# Patient Record
Sex: MALE | Race: OTHER | NOT HISPANIC OR LATINO | URBAN - METROPOLITAN AREA
[De-identification: names, ages, dates, MRNs, and addresses within clinical notes are randomized per-mention and may not be internally consistent; named-entity substitution may affect disease eponyms.]

---

## 2021-07-30 ENCOUNTER — EMERGENCY (EMERGENCY)
Facility: HOSPITAL | Age: 66
LOS: 1 days | Discharge: HOME | End: 2021-07-30
Attending: EMERGENCY MEDICINE | Admitting: EMERGENCY MEDICINE
Payer: COMMERCIAL

## 2021-07-30 VITALS
HEIGHT: 69 IN | DIASTOLIC BLOOD PRESSURE: 84 MMHG | RESPIRATION RATE: 16 BRPM | TEMPERATURE: 98 F | HEART RATE: 98 BPM | WEIGHT: 175.05 LBS | OXYGEN SATURATION: 97 % | SYSTOLIC BLOOD PRESSURE: 142 MMHG

## 2021-07-30 VITALS
HEART RATE: 81 BPM | DIASTOLIC BLOOD PRESSURE: 80 MMHG | SYSTOLIC BLOOD PRESSURE: 119 MMHG | OXYGEN SATURATION: 98 % | RESPIRATION RATE: 15 BRPM | TEMPERATURE: 98 F

## 2021-07-30 LAB
APPEARANCE UR: CLEAR — SIGNIFICANT CHANGE UP
BACTERIA # UR AUTO: ABNORMAL
BILIRUB UR-MCNC: NEGATIVE — SIGNIFICANT CHANGE UP
COLOR SPEC: YELLOW — SIGNIFICANT CHANGE UP
DIFF PNL FLD: ABNORMAL
EPI CELLS # UR: SIGNIFICANT CHANGE UP
GLUCOSE UR QL: 1000 MG/DL
KETONES UR-MCNC: NEGATIVE — SIGNIFICANT CHANGE UP
LEUKOCYTE ESTERASE UR-ACNC: ABNORMAL
NITRITE UR-MCNC: NEGATIVE — SIGNIFICANT CHANGE UP
PH UR: 7 — SIGNIFICANT CHANGE UP (ref 5–8)
PROT UR-MCNC: 15 MG/DL
RBC CASTS # UR COMP ASSIST: ABNORMAL /HPF (ref 0–4)
SP GR SPEC: 1.01 — SIGNIFICANT CHANGE UP (ref 1.01–1.02)
UROBILINOGEN FLD QL: NEGATIVE MG/DL — SIGNIFICANT CHANGE UP
WBC UR QL: SIGNIFICANT CHANGE UP

## 2021-07-30 PROCEDURE — 87086 URINE CULTURE/COLONY COUNT: CPT

## 2021-07-30 PROCEDURE — 51702 INSERT TEMP BLADDER CATH: CPT

## 2021-07-30 PROCEDURE — 99283 EMERGENCY DEPT VISIT LOW MDM: CPT | Mod: 25

## 2021-07-30 PROCEDURE — 81001 URINALYSIS AUTO W/SCOPE: CPT

## 2021-07-30 PROCEDURE — 99284 EMERGENCY DEPT VISIT MOD MDM: CPT

## 2021-07-30 RX ORDER — AZTREONAM 2 G
1 VIAL (EA) INJECTION
Qty: 14 | Refills: 0
Start: 2021-07-30 | End: 2021-08-05

## 2021-07-30 NOTE — ED ADULT NURSE NOTE - OBJECTIVE STATEMENT
pt c/o lower suprapubic abd pain, urinary retention x 4 days and difficulty urinating. pt has hx of BPH in past with new dx of MS. ( currently being worked up for). pt denies any other symptoms. pt denies cp, sob, back pain, numbness, weakness, fevers, chills, body aches, cough, or other symptoms. states he feels like he has to pee but cant. bladder scanned performed on arrival 600cc. mcrae placed per SHARAD Tuttle.

## 2021-07-30 NOTE — ED PROVIDER NOTE - CARE PROVIDERS DIRECT ADDRESSES
[email or web address]

## 2021-07-30 NOTE — ED PROVIDER NOTE - PATIENT PORTAL LINK FT
You can access the FollowMyHealth Patient Portal offered by [facility] by registering at the following website: [email or web address]. By joining [employer]’s FollowMyHealth portal, you will also be able to view your health information using other applications (apps) compatible with our system.

## 2021-07-30 NOTE — ED PROVIDER NOTE - PROGRESS NOTE DETAILS
Gregorio Hicks: 66 y/o M with hx of BPH c/o inability to urinate x7 days, only can pass tiny amounts of urine at a time. denies fever, dysuria, hematuria, abd pain or other sx. Has no urologist. takes Flomax prescribed by PCP in [place]. PE: vital signs stable, afebrile, no distress, abd soft non-tender, moderate suprapubic fullness, no CVA tenderness, extremities no edema, Impression: urinary retention. Plan: Coker catheter, UA, C&S and d/c with leg bag and urology F/U.

## 2021-07-30 NOTE — ED PROVIDER NOTE - NSFOLLOWUPINSTRUCTIONS_ED_ALL_ED_FT
Follow up with urology  drink plenty of water  return to er for any worsening symptoms    Urinary Retention in Men    WHAT YOU NEED TO KNOW:    Urinary retention is a condition that develops when your bladder does not empty completely when you urinate.     Male Reproductive System         DISCHARGE INSTRUCTIONS:    Medicines:   •Medicines can help decrease the size of your prostate, fight infection, and help you urinate more easily.      •Take your medicine as directed. Contact your healthcare provider if you think your medicine is not helping or if you have side effects. Tell him or her if you are allergic to any medicine. Keep a list of the medicines, vitamins, and herbs you take. Include the amounts, and when and why you take them. Bring the list or the pill bottles to follow-up visits. Carry your medicine list with you in case of an emergency.      Coker catheter care: You may need a Coker catheter for up to 2 weeks at home. Healthcare providers will give you a smaller leg bag to collect urine. Keep the bag below your waist. This will prevent urine from flowing back into your bladder and causing an infection or other problems. Also, keep the tube free of kinks so the urine will drain properly. Do not pull on the catheter. This can cause pain and bleeding, and may cause the catheter to come out. Ask your healthcare provider or urologist for more information on Coker catheter care.    Urinate regularly: When your catheter is removed, do not let your bladder become too full before you urinate. Set regular times each day to urinate. Urinate as soon as you feel the need or at least every 3 hours while you are awake. Do not drink liquids before you go to bed. Urinate right before you go to bed.    Follow up with your healthcare provider or urologist as directed: Write down your questions so you remember to ask them during your visits.     Contact your healthcare provider or urologist if:   •You have a fever.      •You have pain when you urinate.      •You have blood in your urine.      •You have problems with your catheter.      •You have questions or concerns about your condition or care.      Return to the emergency department if:   •You have severe abdominal pain.      •You are breathing faster than usual.      •Your heartbeat is faster than usual.      •Your face, hands, feet, or ankles are swollen.

## 2021-07-30 NOTE — ED PROVIDER NOTE - OBJECTIVE STATEMENT
Pt is a 64 yo mal with pmhx of BPH DM Hyperlipemia MS and TB on medication here for increased urinary frequency urgency hesitancy dysuria and hematuria for a few days and today unable to urinate. Pt denies any flank pain nvd fever chills blood thinners. Pt has never had mcrae and no retention before  no urologist  [place] [facility] [place]

## 2021-07-30 NOTE — ED PROVIDER NOTE - CARE PROVIDER_API CALL
Brine Gilmore)  Urology  [address] [place] [address], [address]  [place] [address]  Phone: [phone]  Fax: [phone]  Follow Up Time:

## 2021-07-30 NOTE — ED PROVIDER NOTE - CPE EDP PSYCH NORM
Pt arrived to ED with cc of continued body aches since July 11th. Pt was seen at clinic last week for same issue, patient was prescribed steroids and pain was improved.   
normal...

## 2021-07-30 NOTE — ED ADULT NURSE REASSESSMENT NOTE - NS ED NURSE REASSESS COMMENT FT1
initial urine output post Coker insertion >700cc Ml urine. Pt states immediate relief of pain / discomfort.

## 2021-07-30 NOTE — ED ADULT NURSE NOTE - PMH
BPH (benign prostatic hyperplasia)    DM (diabetes mellitus)    Hyperlipemia    MS (multiple sclerosis)

## 2021-07-31 LAB
CULTURE RESULTS: NO GROWTH — SIGNIFICANT CHANGE UP
SPECIMEN SOURCE: SIGNIFICANT CHANGE UP

## 2021-08-12 ENCOUNTER — EMERGENCY (EMERGENCY)
Facility: HOSPITAL | Age: 66
LOS: 1 days | Discharge: HOME | End: 2021-08-12
Attending: EMERGENCY MEDICINE | Admitting: EMERGENCY MEDICINE
Payer: COMMERCIAL

## 2021-08-12 VITALS
TEMPERATURE: 98 F | HEIGHT: 69 IN | OXYGEN SATURATION: 98 % | DIASTOLIC BLOOD PRESSURE: 79 MMHG | SYSTOLIC BLOOD PRESSURE: 161 MMHG | RESPIRATION RATE: 22 BRPM | HEART RATE: 98 BPM | WEIGHT: 175.05 LBS

## 2021-08-12 VITALS
OXYGEN SATURATION: 99 % | SYSTOLIC BLOOD PRESSURE: 131 MMHG | TEMPERATURE: 98 F | DIASTOLIC BLOOD PRESSURE: 77 MMHG | HEART RATE: 90 BPM | RESPIRATION RATE: 18 BRPM

## 2021-08-12 LAB
APPEARANCE UR: CLEAR — SIGNIFICANT CHANGE UP
BILIRUB UR-MCNC: NEGATIVE — SIGNIFICANT CHANGE UP
COLOR SPEC: YELLOW — SIGNIFICANT CHANGE UP
DIFF PNL FLD: ABNORMAL
GLUCOSE UR QL: 1000 MG/DL
KETONES UR-MCNC: NEGATIVE — SIGNIFICANT CHANGE UP
LEUKOCYTE ESTERASE UR-ACNC: NEGATIVE — SIGNIFICANT CHANGE UP
NITRITE UR-MCNC: NEGATIVE — SIGNIFICANT CHANGE UP
PH UR: 6.5 — SIGNIFICANT CHANGE UP (ref 5–8)
PROT UR-MCNC: NEGATIVE MG/DL — SIGNIFICANT CHANGE UP
SP GR SPEC: 1 — LOW (ref 1.01–1.02)
UROBILINOGEN FLD QL: NEGATIVE MG/DL — SIGNIFICANT CHANGE UP

## 2021-08-12 PROCEDURE — 87086 URINE CULTURE/COLONY COUNT: CPT

## 2021-08-12 PROCEDURE — 99284 EMERGENCY DEPT VISIT MOD MDM: CPT

## 2021-08-12 PROCEDURE — 87186 SC STD MICRODIL/AGAR DIL: CPT

## 2021-08-12 PROCEDURE — 99283 EMERGENCY DEPT VISIT LOW MDM: CPT | Mod: 25

## 2021-08-12 PROCEDURE — 81001 URINALYSIS AUTO W/SCOPE: CPT

## 2021-08-12 NOTE — ED PROVIDER NOTE - NSTIMEPROVIDERCAREINITIATE_GEN_ER
Patient here for medication refills. Medication Reconciliation: complete.    Mis Mar LPN  12/5/2019 10:57 AM   12-Aug-2021 19:30

## 2021-08-12 NOTE — ED ADULT NURSE NOTE - NSICDXPASTMEDICALHX_GEN_ALL_CORE_FT
PAST MEDICAL HISTORY:  BPH (benign prostatic hyperplasia)     DM (diabetes mellitus)     Hyperlipemia     MS (multiple sclerosis)

## 2021-08-12 NOTE — ED PROVIDER NOTE - OBJECTIVE STATEMENT
66 y/o M presents to ED c/o urinating retention. Pt had Coker removed today and last time he urinated was at 3pm. Denies fever, chills, n/v, dysuria, and hematuria. PCP:  Dr. Samuel

## 2021-08-12 NOTE — ED PROVIDER NOTE - NSFOLLOWUPINSTRUCTIONS_ED_ALL_ED_FT
Urinary Retention in Men    WHAT YOU NEED TO KNOW:    Urinary retention is a condition that develops when your bladder does not empty completely when you urinate.     Male Reproductive System         DISCHARGE INSTRUCTIONS:    Medicines:   •Medicines can help decrease the size of your prostate, fight infection, and help you urinate more easily.      •Take your medicine as directed. Contact your healthcare provider if you think your medicine is not helping or if you have side effects. Tell him or her if you are allergic to any medicine. Keep a list of the medicines, vitamins, and herbs you take. Include the amounts, and when and why you take them. Bring the list or the pill bottles to follow-up visits. Carry your medicine list with you in case of an emergency.      Coker catheter care: You may need a Coker catheter for up to 2 weeks at home. Healthcare providers will give you a smaller leg bag to collect urine. Keep the bag below your waist. This will prevent urine from flowing back into your bladder and causing an infection or other problems. Also, keep the tube free of kinks so the urine will drain properly. Do not pull on the catheter. This can cause pain and bleeding, and may cause the catheter to come out. Ask your healthcare provider or urologist for more information on Coker catheter care.    Urinate regularly: When your catheter is removed, do not let your bladder become too full before you urinate. Set regular times each day to urinate. Urinate as soon as you feel the need or at least every 3 hours while you are awake. Do not drink liquids before you go to bed. Urinate right before you go to bed.    Follow up with your healthcare provider or urologist as directed: Write down your questions so you remember to ask them during your visits.     Contact your healthcare provider or urologist if:   •You have a fever.      •You have pain when you urinate.      •You have blood in your urine.      •You have problems with your catheter.      •You have questions or concerns about your condition or care.      Return to the emergency department if:   •You have severe abdominal pain.      •You are breathing faster than usual.      •Your heartbeat is faster than usual.      •Your face, hands, feet, or ankles are swollen.

## 2021-08-12 NOTE — ED ADULT TRIAGE NOTE - PAIN: PRESENCE, MLM
Patient requesting prescription for name brand synthroid prescription, does not want generic. Please send ASAP, patient almost out of medication.   complains of pain/discomfort

## 2021-08-12 NOTE — ED PROVIDER NOTE - PATIENT PORTAL LINK FT
You can access the FollowMyHealth Patient Portal offered by [facility] by registering at the following website: [email or web address]. By joining [employer]’s FollowMyHealth portal, you will also be able to view your health information using other applications (apps) compatible with our system.

## 2021-08-12 NOTE — ED PROVIDER NOTE - CARE PROVIDER_API CALL
Brien Gilmore)  Urology  [address] [place] [address], [address]  [place] [address]  Phone: [phone]  Fax: [phone]  Follow Up Time: 1-3 Days

## 2021-08-12 NOTE — ED ADULT NURSE NOTE - OBJECTIVE STATEMENT
pt c/o intense lower abd pain, states he cant cas. pt had Mcrae removed today at urologist office at 12pm today last urinated at 3pm, but not a lot. has a lot of pressure, cant sit still. requesting mcrae

## 2021-08-12 NOTE — ED PROVIDER NOTE - PROGRESS NOTE DETAILS
put out over 700mL urine initially, switching to leg bag, was supposed to see  in the morning, pt will call and let them know what happened

## 2021-08-13 PROBLEM — G35 MULTIPLE SCLEROSIS: Chronic | Status: ACTIVE | Noted: 2021-07-30

## 2021-08-13 PROBLEM — N40.0 BENIGN PROSTATIC HYPERPLASIA WITHOUT LOWER URINARY TRACT SYMPTOMS: Chronic | Status: ACTIVE | Noted: 2021-07-30

## 2021-08-13 PROBLEM — E78.5 HYPERLIPIDEMIA, UNSPECIFIED: Chronic | Status: ACTIVE | Noted: 2021-07-30

## 2021-08-13 PROBLEM — E11.9 TYPE 2 DIABETES MELLITUS WITHOUT COMPLICATIONS: Chronic | Status: ACTIVE | Noted: 2021-07-30

## 2021-08-17 NOTE — ED POST DISCHARGE NOTE - RESULT SUMMARY
urine culture + ecoli, resistant to bactrim, call placed to patient but no answer, will try again later

## 2021-08-20 ENCOUNTER — EMERGENCY (EMERGENCY)
Facility: HOSPITAL | Age: 66
LOS: 1 days | Discharge: HOME | End: 2021-08-20
Attending: EMERGENCY MEDICINE | Admitting: INTERNAL MEDICINE
Payer: COMMERCIAL

## 2021-08-20 VITALS
HEART RATE: 100 BPM | WEIGHT: 173.94 LBS | SYSTOLIC BLOOD PRESSURE: 124 MMHG | DIASTOLIC BLOOD PRESSURE: 60 MMHG | OXYGEN SATURATION: 95 % | RESPIRATION RATE: 20 BRPM | HEIGHT: 69 IN | TEMPERATURE: 99 F

## 2021-08-20 VITALS
OXYGEN SATURATION: 100 % | HEART RATE: 98 BPM | DIASTOLIC BLOOD PRESSURE: 77 MMHG | SYSTOLIC BLOOD PRESSURE: 139 MMHG | RESPIRATION RATE: 16 BRPM | TEMPERATURE: 98 F

## 2021-08-20 LAB
ALBUMIN SERPL ELPH-MCNC: 3.3 G/DL — SIGNIFICANT CHANGE UP (ref 3.3–5)
ALP SERPL-CCNC: 81 U/L — SIGNIFICANT CHANGE UP (ref 30–120)
ALT FLD-CCNC: 26 U/L DA — SIGNIFICANT CHANGE UP (ref 10–60)
ANION GAP SERPL CALC-SCNC: 10 MMOL/L — SIGNIFICANT CHANGE UP (ref 5–17)
APPEARANCE UR: ABNORMAL
APTT BLD: 32.4 SEC — SIGNIFICANT CHANGE UP (ref 27.5–35.5)
AST SERPL-CCNC: 25 U/L — SIGNIFICANT CHANGE UP (ref 10–40)
BACTERIA # UR AUTO: ABNORMAL
BASOPHILS # BLD AUTO: 0.02 K/UL — SIGNIFICANT CHANGE UP (ref 0–0.2)
BASOPHILS NFR BLD AUTO: 0.3 % — SIGNIFICANT CHANGE UP (ref 0–2)
BILIRUB SERPL-MCNC: 1.3 MG/DL — HIGH (ref 0.2–1.2)
BILIRUB UR-MCNC: NEGATIVE — SIGNIFICANT CHANGE UP
BUN SERPL-MCNC: 22 MG/DL — SIGNIFICANT CHANGE UP (ref 7–23)
CALCIUM SERPL-MCNC: 8.4 MG/DL — SIGNIFICANT CHANGE UP (ref 8.4–10.5)
CHLORIDE SERPL-SCNC: 95 MMOL/L — LOW (ref 96–108)
CO2 SERPL-SCNC: 27 MMOL/L — SIGNIFICANT CHANGE UP (ref 22–31)
COLOR SPEC: YELLOW — SIGNIFICANT CHANGE UP
CREAT SERPL-MCNC: 1.12 MG/DL — SIGNIFICANT CHANGE UP (ref 0.5–1.3)
DIFF PNL FLD: ABNORMAL
EOSINOPHIL # BLD AUTO: 0.02 K/UL — SIGNIFICANT CHANGE UP (ref 0–0.5)
EOSINOPHIL NFR BLD AUTO: 0.3 % — SIGNIFICANT CHANGE UP (ref 0–6)
EPI CELLS # UR: SIGNIFICANT CHANGE UP
GLUCOSE SERPL-MCNC: 132 MG/DL — HIGH (ref 70–99)
GLUCOSE UR QL: 250 MG/DL
HCT VFR BLD CALC: 39.1 % — SIGNIFICANT CHANGE UP (ref 39–50)
HGB BLD-MCNC: 13.1 G/DL — SIGNIFICANT CHANGE UP (ref 13–17)
IMM GRANULOCYTES NFR BLD AUTO: 0.4 % — SIGNIFICANT CHANGE UP (ref 0–1.5)
INR BLD: 1.39 RATIO — HIGH (ref 0.88–1.16)
KETONES UR-MCNC: ABNORMAL
LACTATE SERPL-SCNC: 0.9 MMOL/L — SIGNIFICANT CHANGE UP (ref 0.7–2)
LEUKOCYTE ESTERASE UR-ACNC: ABNORMAL
LYMPHOCYTES # BLD AUTO: 0.71 K/UL — LOW (ref 1–3.3)
LYMPHOCYTES # BLD AUTO: 8.9 % — LOW (ref 13–44)
MCHC RBC-ENTMCNC: 28.5 PG — SIGNIFICANT CHANGE UP (ref 27–34)
MCHC RBC-ENTMCNC: 33.5 GM/DL — SIGNIFICANT CHANGE UP (ref 32–36)
MCV RBC AUTO: 85.2 FL — SIGNIFICANT CHANGE UP (ref 80–100)
MONOCYTES # BLD AUTO: 0.6 K/UL — SIGNIFICANT CHANGE UP (ref 0–0.9)
MONOCYTES NFR BLD AUTO: 7.5 % — SIGNIFICANT CHANGE UP (ref 2–14)
NEUTROPHILS # BLD AUTO: 6.59 K/UL — SIGNIFICANT CHANGE UP (ref 1.8–7.4)
NEUTROPHILS NFR BLD AUTO: 82.6 % — HIGH (ref 43–77)
NITRITE UR-MCNC: POSITIVE
NRBC # BLD: 0 /100 WBCS — SIGNIFICANT CHANGE UP (ref 0–0)
PH UR: 6 — SIGNIFICANT CHANGE UP (ref 5–8)
PLATELET # BLD AUTO: 153 K/UL — SIGNIFICANT CHANGE UP (ref 150–400)
POTASSIUM SERPL-MCNC: 3.6 MMOL/L — SIGNIFICANT CHANGE UP (ref 3.5–5.3)
POTASSIUM SERPL-SCNC: 3.6 MMOL/L — SIGNIFICANT CHANGE UP (ref 3.5–5.3)
PROT SERPL-MCNC: 8 G/DL — SIGNIFICANT CHANGE UP (ref 6–8.3)
PROT UR-MCNC: 30 MG/DL
PROTHROM AB SERPL-ACNC: 16.6 SEC — HIGH (ref 10.6–13.6)
RBC # BLD: 4.59 M/UL — SIGNIFICANT CHANGE UP (ref 4.2–5.8)
RBC # FLD: 14.4 % — SIGNIFICANT CHANGE UP (ref 10.3–14.5)
RBC CASTS # UR COMP ASSIST: SIGNIFICANT CHANGE UP /HPF (ref 0–4)
SARS-COV-2 RNA SPEC QL NAA+PROBE: SIGNIFICANT CHANGE UP
SODIUM SERPL-SCNC: 132 MMOL/L — LOW (ref 135–145)
SP GR SPEC: 1 — LOW (ref 1.01–1.02)
UROBILINOGEN FLD QL: NEGATIVE MG/DL — SIGNIFICANT CHANGE UP
WBC # BLD: 7.97 K/UL — SIGNIFICANT CHANGE UP (ref 3.8–10.5)
WBC # FLD AUTO: 7.97 K/UL — SIGNIFICANT CHANGE UP (ref 3.8–10.5)
WBC UR QL: ABNORMAL

## 2021-08-20 PROCEDURE — 99285 EMERGENCY DEPT VISIT HI MDM: CPT

## 2021-08-20 PROCEDURE — 71045 X-RAY EXAM CHEST 1 VIEW: CPT | Mod: 26

## 2021-08-20 RX ORDER — INSULIN GLARGINE 100 [IU]/ML
0 INJECTION, SOLUTION SUBCUTANEOUS
Qty: 0 | Refills: 0 | DISCHARGE

## 2021-08-20 RX ORDER — DULAGLUTIDE 4.5 MG/.5ML
0 INJECTION, SOLUTION SUBCUTANEOUS
Qty: 0 | Refills: 0 | DISCHARGE

## 2021-08-20 RX ORDER — ACETAMINOPHEN 500 MG
650 TABLET ORAL ONCE
Refills: 0 | Status: COMPLETED | OUTPATIENT
Start: 2021-08-20 | End: 2021-08-20

## 2021-08-20 RX ORDER — PIPERACILLIN AND TAZOBACTAM 4; .5 G/20ML; G/20ML
3.38 INJECTION, POWDER, LYOPHILIZED, FOR SOLUTION INTRAVENOUS ONCE
Refills: 0 | Status: COMPLETED | OUTPATIENT
Start: 2021-08-20 | End: 2021-08-20

## 2021-08-20 RX ORDER — SERTRALINE 25 MG/1
1 TABLET, FILM COATED ORAL
Qty: 0 | Refills: 0 | DISCHARGE

## 2021-08-20 RX ORDER — CEFUROXIME AXETIL 250 MG
1 TABLET ORAL
Qty: 20 | Refills: 0
Start: 2021-08-20 | End: 2021-08-29

## 2021-08-20 RX ORDER — SODIUM CHLORIDE 9 MG/ML
2400 INJECTION INTRAMUSCULAR; INTRAVENOUS; SUBCUTANEOUS ONCE
Refills: 0 | Status: COMPLETED | OUTPATIENT
Start: 2021-08-20 | End: 2021-08-20

## 2021-08-20 RX ORDER — CYCLOBENZAPRINE HYDROCHLORIDE 10 MG/1
0 TABLET, FILM COATED ORAL
Qty: 0 | Refills: 0 | DISCHARGE

## 2021-08-20 RX ORDER — MIRTAZAPINE 45 MG/1
0 TABLET, ORALLY DISINTEGRATING ORAL
Qty: 0 | Refills: 0 | DISCHARGE

## 2021-08-20 RX ORDER — EMPAGLIFLOZIN, METFORMIN HYDROCHLORIDE 10; 1000 MG/1; MG/1
2 TABLET, EXTENDED RELEASE ORAL
Qty: 0 | Refills: 0 | DISCHARGE

## 2021-08-20 RX ORDER — SIMVASTATIN 20 MG/1
1 TABLET, FILM COATED ORAL
Qty: 0 | Refills: 0 | DISCHARGE

## 2021-08-20 RX ADMIN — Medication 650 MILLIGRAM(S): at 19:15

## 2021-08-20 RX ADMIN — PIPERACILLIN AND TAZOBACTAM 200 GRAM(S): 4; .5 INJECTION, POWDER, LYOPHILIZED, FOR SOLUTION INTRAVENOUS at 19:17

## 2021-08-20 RX ADMIN — SODIUM CHLORIDE 2400 MILLILITER(S): 9 INJECTION INTRAMUSCULAR; INTRAVENOUS; SUBCUTANEOUS at 19:17

## 2021-08-20 NOTE — ED PROVIDER NOTE - TEMPLATE
Symptoms High Dose Vitamin A Counseling: Side effects reviewed, pt to contact office should one occur.

## 2021-08-20 NOTE — ED PROVIDER NOTE - NSFOLLOWUPINSTRUCTIONS_ED_ALL_ED_FT
Take Ceftin 500mg twice daily for Urinary Tract Infection  If you have fevers or chills than return to the emergency room immediately   F/U with Dr Gilmore on Monday     A urinary tract infection (UTI) is an infection of any part of the urinary tract, which includes the kidneys, ureters, bladder, and urethra. Risk factors include ignoring your need to urinate, wiping back to front if female, being an uncircumcised male, and having diabetes or a weak immune system. Symptoms include frequent urination, pain or burning with urination, foul smelling urine, cloudy urine, pain in the lower abdomen, blood in the urine, and fever. If you were prescribed an antibiotic medicine, take it as told by your health care provider. Do not stop taking the antibiotic even if you start to feel better.    SEEK IMMEDIATE MEDICAL CARE IF YOU HAVE ANY OF THE FOLLOWING SYMPTOMS: severe back or abdominal pain, fever, inability to keep fluids or medicine down, dizziness/lightheadedness, or a change in mental status.

## 2021-08-20 NOTE — ED PROVIDER NOTE - PATIENT PORTAL LINK FT
You can access the FollowMyHealth Patient Portal offered by [facility] by registering at the following website: [email or web address]. By joining [employer]’s FollowMyHealth portal, you will also be able to view your health information using other applications (apps) compatible with our system.

## 2021-08-20 NOTE — ED PROVIDER NOTE - PROGRESS NOTE DETAILS
the patient wants to go home, he is afebrile, no chills, no abdominal pain, no N/V, no back pain, he is feeling improved. Labs reporting normal WBC and normal lactate. He has the Coker in for 15 days, awaiting definitive procedure from Dr Gilmore, Bactrim was given empirically the first five days. Hasn't been on AB for the past 10 days. If he develops fever, chills, no any changes he will return to the ED immediately

## 2021-08-20 NOTE — ED ADULT NURSE NOTE - NSICDXPASTMEDICALHX_GEN_ALL_CORE_FT
PAST MEDICAL HISTORY:  Anxiety     BPH (benign prostatic hyperplasia)     DM (diabetes mellitus)     Hyperlipemia     MS (multiple sclerosis)     Tuberculosis

## 2021-08-20 NOTE — ED ADULT NURSE NOTE - NSIMPLEMENTINTERV_GEN_ALL_ED
Implemented All Fall Risk Interventions:  [place] to call system. Call bell, personal items and telephone within reach. Instruct patient to call for assistance. Room bathroom lighting operational. Non-slip footwear when patient is off stretcher. Physically safe environment: no spills, clutter or unnecessary equipment. Stretcher in lowest position, wheels locked, appropriate side rails in place. Provide visual cue, wrist band, yellow gown, etc. Monitor gait and stability. Monitor for mental status changes and reorient to person, place, and time. Review medications for side effects contributing to fall risk. Reinforce activity limits and safety measures with patient and family.

## 2021-08-20 NOTE — ED PROVIDER NOTE - CARE PROVIDER_API CALL
Brien Gilmore)  Urology  [address] [place] [address], [address]  [place] [address]  Phone: [phone]  Fax: [phone]  Follow Up Time: Urgent

## 2021-08-20 NOTE — ED PROVIDER NOTE - OBJECTIVE STATEMENT
64 y/o M with a PMHx of MS, BPH, HLD, DM presents to ED c/o HA, fever, chills, lower abd pain, and nausea today. Pt was recently treated for UTI, has been seeing urology for prostate enlargement and is planning prostate surgery in the near future. States he had a temp of 101 today. Recently completed course of bactrim. Denies cough, SOB, or other sx. Pt is fully vaccinated for COVID. PCP: Dr. Samuel, Urology: Dr. Gilmore

## 2021-08-20 NOTE — ED ADULT NURSE REASSESSMENT NOTE - NS ED NURSE REASSESS COMMENT FT1
MD aware of all results. pt informed of same. d/c to self. urinary catheter to SBG converted to leg bag. pt left unit in NAD. ambulated unassisted with cane and per his usual state of health urinary catheter present on arrival to ED changed for hospital policy. MD aware of all results. pt informed of same. d/c to self. urinary catheter to SBG converted to leg bag. pt left unit in NAD. ambulated unassisted with cane and per his usual state of health urinary catheter present on arrival to ED changed as per hospital policy. MD aware of all results. pt informed of same. d/c to self. urinary catheter to SBG converted to leg bag. pt left unit in NAD. ambulated unassisted with cane and per his usual state of health

## 2021-08-20 NOTE — ED ADULT NURSE NOTE - CHPI ED NUR SYMPTOMS NEG
no back pain/no decreased eating/drinking/no dizziness/no loss of consciousness/no nausea/no pain/no vomiting

## 2021-08-21 LAB
E COLI DNA BLD POS QL NAA+NON-PROBE: SIGNIFICANT CHANGE UP
GRAM STN FLD: SIGNIFICANT CHANGE UP
GRAM STN FLD: SIGNIFICANT CHANGE UP
METHOD TYPE: SIGNIFICANT CHANGE UP
SPECIMEN SOURCE: SIGNIFICANT CHANGE UP

## 2021-08-21 NOTE — PROVIDER CONTACT NOTE (CRITICAL VALUE NOTIFICATION) - ACTION/TREATMENT ORDERED:
md called pt this morning and pt feeling better and started on a new antibiotic yesterday will await second culture

## 2021-08-22 ENCOUNTER — INPATIENT (INPATIENT)
Facility: HOSPITAL | Age: 66
LOS: 2 days | Discharge: HOME | DRG: 699 | End: 2021-08-25
Attending: INTERNAL MEDICINE | Admitting: INTERNAL MEDICINE
Payer: COMMERCIAL

## 2021-08-22 VITALS
OXYGEN SATURATION: 96 % | RESPIRATION RATE: 18 BRPM | TEMPERATURE: 98 F | WEIGHT: 175.05 LBS | HEART RATE: 92 BPM | SYSTOLIC BLOOD PRESSURE: 124 MMHG | DIASTOLIC BLOOD PRESSURE: 74 MMHG | HEIGHT: 69 IN

## 2021-08-22 DIAGNOSIS — R78.81 BACTEREMIA: ICD-10-CM

## 2021-08-22 LAB
ALBUMIN SERPL ELPH-MCNC: 2.9 G/DL — LOW (ref 3.3–5)
ALP SERPL-CCNC: 163 U/L — HIGH (ref 30–120)
ALT FLD-CCNC: 77 U/L DA — HIGH (ref 10–60)
ANION GAP SERPL CALC-SCNC: 8 MMOL/L — SIGNIFICANT CHANGE UP (ref 5–17)
APPEARANCE UR: CLEAR — SIGNIFICANT CHANGE UP
APTT BLD: 31.6 SEC — SIGNIFICANT CHANGE UP (ref 27.5–35.5)
AST SERPL-CCNC: 87 U/L — HIGH (ref 10–40)
BACTERIA # UR AUTO: ABNORMAL
BASOPHILS # BLD AUTO: 0.02 K/UL — SIGNIFICANT CHANGE UP (ref 0–0.2)
BASOPHILS NFR BLD AUTO: 0.4 % — SIGNIFICANT CHANGE UP (ref 0–2)
BILIRUB SERPL-MCNC: 2.4 MG/DL — HIGH (ref 0.2–1.2)
BILIRUB UR-MCNC: ABNORMAL
BUN SERPL-MCNC: 14 MG/DL — SIGNIFICANT CHANGE UP (ref 7–23)
CALCIUM SERPL-MCNC: 8.5 MG/DL — SIGNIFICANT CHANGE UP (ref 8.4–10.5)
CHLORIDE SERPL-SCNC: 98 MMOL/L — SIGNIFICANT CHANGE UP (ref 96–108)
CO2 SERPL-SCNC: 28 MMOL/L — SIGNIFICANT CHANGE UP (ref 22–31)
COLOR SPEC: YELLOW — SIGNIFICANT CHANGE UP
CREAT SERPL-MCNC: 1.03 MG/DL — SIGNIFICANT CHANGE UP (ref 0.5–1.3)
DIFF PNL FLD: ABNORMAL
EOSINOPHIL # BLD AUTO: 0.07 K/UL — SIGNIFICANT CHANGE UP (ref 0–0.5)
EOSINOPHIL NFR BLD AUTO: 1.3 % — SIGNIFICANT CHANGE UP (ref 0–6)
EPI CELLS # UR: SIGNIFICANT CHANGE UP
GLUCOSE SERPL-MCNC: 210 MG/DL — HIGH (ref 70–99)
GLUCOSE UR QL: 1000 MG/DL
HCT VFR BLD CALC: 37.4 % — LOW (ref 39–50)
HGB BLD-MCNC: 12.7 G/DL — LOW (ref 13–17)
IMM GRANULOCYTES NFR BLD AUTO: 0.2 % — SIGNIFICANT CHANGE UP (ref 0–1.5)
INR BLD: 1.38 RATIO — HIGH (ref 0.88–1.16)
KETONES UR-MCNC: NEGATIVE — SIGNIFICANT CHANGE UP
LACTATE SERPL-SCNC: 0.9 MMOL/L — SIGNIFICANT CHANGE UP (ref 0.7–2)
LEUKOCYTE ESTERASE UR-ACNC: ABNORMAL
LYMPHOCYTES # BLD AUTO: 0.58 K/UL — LOW (ref 1–3.3)
LYMPHOCYTES # BLD AUTO: 10.6 % — LOW (ref 13–44)
MCHC RBC-ENTMCNC: 28.7 PG — SIGNIFICANT CHANGE UP (ref 27–34)
MCHC RBC-ENTMCNC: 34 GM/DL — SIGNIFICANT CHANGE UP (ref 32–36)
MCV RBC AUTO: 84.6 FL — SIGNIFICANT CHANGE UP (ref 80–100)
MONOCYTES # BLD AUTO: 0.67 K/UL — SIGNIFICANT CHANGE UP (ref 0–0.9)
MONOCYTES NFR BLD AUTO: 12.2 % — SIGNIFICANT CHANGE UP (ref 2–14)
NEUTROPHILS # BLD AUTO: 4.13 K/UL — SIGNIFICANT CHANGE UP (ref 1.8–7.4)
NEUTROPHILS NFR BLD AUTO: 75.3 % — SIGNIFICANT CHANGE UP (ref 43–77)
NITRITE UR-MCNC: NEGATIVE — SIGNIFICANT CHANGE UP
NRBC # BLD: 0 /100 WBCS — SIGNIFICANT CHANGE UP (ref 0–0)
PH UR: 6.5 — SIGNIFICANT CHANGE UP (ref 5–8)
PLATELET # BLD AUTO: 143 K/UL — LOW (ref 150–400)
POTASSIUM SERPL-MCNC: 3.5 MMOL/L — SIGNIFICANT CHANGE UP (ref 3.5–5.3)
POTASSIUM SERPL-SCNC: 3.5 MMOL/L — SIGNIFICANT CHANGE UP (ref 3.5–5.3)
PROT SERPL-MCNC: 7.6 G/DL — SIGNIFICANT CHANGE UP (ref 6–8.3)
PROT UR-MCNC: 30 MG/DL
PROTHROM AB SERPL-ACNC: 16.5 SEC — HIGH (ref 10.6–13.6)
RBC # BLD: 4.42 M/UL — SIGNIFICANT CHANGE UP (ref 4.2–5.8)
RBC # FLD: 14.7 % — HIGH (ref 10.3–14.5)
RBC CASTS # UR COMP ASSIST: ABNORMAL /HPF (ref 0–4)
SARS-COV-2 RNA SPEC QL NAA+PROBE: SIGNIFICANT CHANGE UP
SODIUM SERPL-SCNC: 134 MMOL/L — LOW (ref 135–145)
SP GR SPEC: 1 — LOW (ref 1.01–1.02)
UROBILINOGEN FLD QL: 4 MG/DL
WBC # BLD: 5.48 K/UL — SIGNIFICANT CHANGE UP (ref 3.8–10.5)
WBC # FLD AUTO: 5.48 K/UL — SIGNIFICANT CHANGE UP (ref 3.8–10.5)
WBC UR QL: SIGNIFICANT CHANGE UP

## 2021-08-22 PROCEDURE — 74177 CT ABD & PELVIS W/CONTRAST: CPT | Mod: 26,MG

## 2021-08-22 PROCEDURE — 99223 1ST HOSP IP/OBS HIGH 75: CPT

## 2021-08-22 PROCEDURE — G1004: CPT

## 2021-08-22 PROCEDURE — 71260 CT THORAX DX C+: CPT | Mod: 26,MG

## 2021-08-22 PROCEDURE — 99285 EMERGENCY DEPT VISIT HI MDM: CPT

## 2021-08-22 PROCEDURE — 71046 X-RAY EXAM CHEST 2 VIEWS: CPT | Mod: 26

## 2021-08-22 PROCEDURE — 93010 ELECTROCARDIOGRAM REPORT: CPT

## 2021-08-22 RX ORDER — ZOLPIDEM TARTRATE 10 MG/1
5 TABLET ORAL AT BEDTIME
Refills: 0 | Status: DISCONTINUED | OUTPATIENT
Start: 2021-08-22 | End: 2021-08-25

## 2021-08-22 RX ORDER — CYCLOBENZAPRINE HYDROCHLORIDE 10 MG/1
1 TABLET, FILM COATED ORAL
Qty: 0 | Refills: 0 | DISCHARGE

## 2021-08-22 RX ORDER — SODIUM CHLORIDE 9 MG/ML
1000 INJECTION, SOLUTION INTRAVENOUS
Refills: 0 | Status: DISCONTINUED | OUTPATIENT
Start: 2021-08-22 | End: 2021-08-23

## 2021-08-22 RX ORDER — DULAGLUTIDE 4.5 MG/.5ML
0 INJECTION, SOLUTION SUBCUTANEOUS
Qty: 0 | Refills: 0 | DISCHARGE

## 2021-08-22 RX ORDER — TAMSULOSIN HYDROCHLORIDE 0.4 MG/1
0.8 CAPSULE ORAL AT BEDTIME
Refills: 0 | Status: DISCONTINUED | OUTPATIENT
Start: 2021-08-22 | End: 2021-08-25

## 2021-08-22 RX ORDER — MIRTAZAPINE 45 MG/1
0 TABLET, ORALLY DISINTEGRATING ORAL
Qty: 0 | Refills: 0 | DISCHARGE

## 2021-08-22 RX ORDER — SIMVASTATIN 20 MG/1
1 TABLET, FILM COATED ORAL
Qty: 0 | Refills: 0 | DISCHARGE

## 2021-08-22 RX ORDER — MIRTAZAPINE 45 MG/1
15 TABLET, ORALLY DISINTEGRATING ORAL
Qty: 0 | Refills: 0 | DISCHARGE

## 2021-08-22 RX ORDER — INSULIN GLARGINE 100 [IU]/ML
64 INJECTION, SOLUTION SUBCUTANEOUS
Qty: 0 | Refills: 0 | DISCHARGE

## 2021-08-22 RX ORDER — TAMSULOSIN HYDROCHLORIDE 0.4 MG/1
2 CAPSULE ORAL
Qty: 0 | Refills: 0 | DISCHARGE

## 2021-08-22 RX ORDER — DEXTROSE 50 % IN WATER 50 %
25 SYRINGE (ML) INTRAVENOUS ONCE
Refills: 0 | Status: DISCONTINUED | OUTPATIENT
Start: 2021-08-22 | End: 2021-08-25

## 2021-08-22 RX ORDER — MIRTAZAPINE 45 MG/1
7.5 TABLET, ORALLY DISINTEGRATING ORAL AT BEDTIME
Refills: 0 | Status: DISCONTINUED | OUTPATIENT
Start: 2021-08-22 | End: 2021-08-24

## 2021-08-22 RX ORDER — INSULIN GLARGINE 100 [IU]/ML
64 INJECTION, SOLUTION SUBCUTANEOUS AT BEDTIME
Refills: 0 | Status: DISCONTINUED | OUTPATIENT
Start: 2021-08-22 | End: 2021-08-23

## 2021-08-22 RX ORDER — CLONAZEPAM 1 MG
1 TABLET ORAL ONCE
Refills: 0 | Status: DISCONTINUED | OUTPATIENT
Start: 2021-08-22 | End: 2021-08-22

## 2021-08-22 RX ORDER — CLONAZEPAM 1 MG
1 TABLET ORAL
Qty: 0 | Refills: 0 | DISCHARGE

## 2021-08-22 RX ORDER — CYCLOBENZAPRINE HYDROCHLORIDE 10 MG/1
5 TABLET, FILM COATED ORAL AT BEDTIME
Refills: 0 | Status: DISCONTINUED | OUTPATIENT
Start: 2021-08-22 | End: 2021-08-25

## 2021-08-22 RX ORDER — INSULIN LISPRO 100/ML
VIAL (ML) SUBCUTANEOUS
Refills: 0 | Status: DISCONTINUED | OUTPATIENT
Start: 2021-08-22 | End: 2021-08-25

## 2021-08-22 RX ORDER — SERTRALINE 25 MG/1
100 TABLET, FILM COATED ORAL DAILY
Refills: 0 | Status: DISCONTINUED | OUTPATIENT
Start: 2021-08-22 | End: 2021-08-25

## 2021-08-22 RX ORDER — CLONAZEPAM 1 MG
1 TABLET ORAL AT BEDTIME
Refills: 0 | Status: DISCONTINUED | OUTPATIENT
Start: 2021-08-22 | End: 2021-08-25

## 2021-08-22 RX ORDER — CEFTRIAXONE 500 MG/1
1000 INJECTION, POWDER, FOR SOLUTION INTRAMUSCULAR; INTRAVENOUS ONCE
Refills: 0 | Status: COMPLETED | OUTPATIENT
Start: 2021-08-22 | End: 2021-08-22

## 2021-08-22 RX ORDER — DEXTROSE 50 % IN WATER 50 %
15 SYRINGE (ML) INTRAVENOUS ONCE
Refills: 0 | Status: DISCONTINUED | OUTPATIENT
Start: 2021-08-22 | End: 2021-08-25

## 2021-08-22 RX ORDER — GLUCAGON INJECTION, SOLUTION 0.5 MG/.1ML
1 INJECTION, SOLUTION SUBCUTANEOUS ONCE
Refills: 0 | Status: DISCONTINUED | OUTPATIENT
Start: 2021-08-22 | End: 2021-08-25

## 2021-08-22 RX ORDER — ASPIRIN/CALCIUM CARB/MAGNESIUM 324 MG
1 TABLET ORAL
Qty: 0 | Refills: 0 | DISCHARGE

## 2021-08-22 RX ORDER — ZOLPIDEM TARTRATE 10 MG/1
1 TABLET ORAL
Qty: 0 | Refills: 0 | DISCHARGE

## 2021-08-22 RX ORDER — INSULIN LISPRO 100/ML
VIAL (ML) SUBCUTANEOUS AT BEDTIME
Refills: 0 | Status: DISCONTINUED | OUTPATIENT
Start: 2021-08-22 | End: 2021-08-25

## 2021-08-22 RX ORDER — MIRTAZAPINE 45 MG/1
1 TABLET, ORALLY DISINTEGRATING ORAL
Qty: 0 | Refills: 0 | DISCHARGE

## 2021-08-22 RX ORDER — DEXTROSE 50 % IN WATER 50 %
12.5 SYRINGE (ML) INTRAVENOUS ONCE
Refills: 0 | Status: DISCONTINUED | OUTPATIENT
Start: 2021-08-22 | End: 2021-08-25

## 2021-08-22 RX ORDER — TERAZOSIN HYDROCHLORIDE 10 MG/1
1 CAPSULE ORAL
Qty: 0 | Refills: 0 | DISCHARGE

## 2021-08-22 RX ORDER — SODIUM CHLORIDE 9 MG/ML
1000 INJECTION, SOLUTION INTRAVENOUS
Refills: 0 | Status: DISCONTINUED | OUTPATIENT
Start: 2021-08-22 | End: 2021-08-25

## 2021-08-22 RX ORDER — ASPIRIN/CALCIUM CARB/MAGNESIUM 324 MG
81 TABLET ORAL DAILY
Refills: 0 | Status: DISCONTINUED | OUTPATIENT
Start: 2021-08-22 | End: 2021-08-25

## 2021-08-22 RX ORDER — SODIUM CHLORIDE 9 MG/ML
1000 INJECTION INTRAMUSCULAR; INTRAVENOUS; SUBCUTANEOUS ONCE
Refills: 0 | Status: COMPLETED | OUTPATIENT
Start: 2021-08-22 | End: 2021-08-22

## 2021-08-22 RX ORDER — TAMSULOSIN HYDROCHLORIDE 0.4 MG/1
1 CAPSULE ORAL
Qty: 0 | Refills: 0 | DISCHARGE

## 2021-08-22 RX ORDER — SERTRALINE 25 MG/1
1 TABLET, FILM COATED ORAL
Qty: 0 | Refills: 0 | DISCHARGE

## 2021-08-22 RX ORDER — EMPAGLIFLOZIN, METFORMIN HYDROCHLORIDE 10; 1000 MG/1; MG/1
2 TABLET, EXTENDED RELEASE ORAL
Qty: 0 | Refills: 0 | DISCHARGE

## 2021-08-22 RX ORDER — ENOXAPARIN SODIUM 100 MG/ML
40 INJECTION SUBCUTANEOUS DAILY
Refills: 0 | Status: DISCONTINUED | OUTPATIENT
Start: 2021-08-22 | End: 2021-08-25

## 2021-08-22 RX ADMIN — CEFTRIAXONE 1000 MILLIGRAM(S): 500 INJECTION, POWDER, FOR SOLUTION INTRAMUSCULAR; INTRAVENOUS at 18:04

## 2021-08-22 RX ADMIN — CEFTRIAXONE 100 MILLIGRAM(S): 500 INJECTION, POWDER, FOR SOLUTION INTRAMUSCULAR; INTRAVENOUS at 17:07

## 2021-08-22 RX ADMIN — SERTRALINE 100 MILLIGRAM(S): 25 TABLET, FILM COATED ORAL at 23:59

## 2021-08-22 RX ADMIN — SODIUM CHLORIDE 1000 MILLILITER(S): 9 INJECTION INTRAMUSCULAR; INTRAVENOUS; SUBCUTANEOUS at 18:05

## 2021-08-22 RX ADMIN — Medication 1 TABLET(S): at 23:59

## 2021-08-22 RX ADMIN — Medication 1 MILLIGRAM(S): at 23:50

## 2021-08-22 RX ADMIN — MIRTAZAPINE 7.5 MILLIGRAM(S): 45 TABLET, ORALLY DISINTEGRATING ORAL at 23:59

## 2021-08-22 RX ADMIN — Medication 1 MILLIGRAM(S): at 18:08

## 2021-08-22 RX ADMIN — SODIUM CHLORIDE 1000 MILLILITER(S): 9 INJECTION INTRAMUSCULAR; INTRAVENOUS; SUBCUTANEOUS at 19:03

## 2021-08-22 RX ADMIN — ENOXAPARIN SODIUM 40 MILLIGRAM(S): 100 INJECTION SUBCUTANEOUS at 23:59

## 2021-08-22 RX ADMIN — CYCLOBENZAPRINE HYDROCHLORIDE 5 MILLIGRAM(S): 10 TABLET, FILM COATED ORAL at 23:59

## 2021-08-22 NOTE — ED PROVIDER NOTE - CLINICAL SUMMARY MEDICAL DECISION MAKING FREE TEXT BOX
64 y/o M called back in to ED for E coli bacteremia. Plan: labs, cultures, IV antibiotics, and admission.

## 2021-08-22 NOTE — H&P ADULT - PROBLEM SELECTOR PLAN 1
no hydronephrosis or hydroureter, was started on Bactrim DS over the past 2 day, cultures showed E-coli in urine & blood, his Coker was changed 2 days ago, admitted to medicine, was started on Ceftriaxone 1 gm IVPB q 24h, trend temp, monitor TLC, and f/u sensitivity results, ID consult with Dr. Fox was called.

## 2021-08-22 NOTE — ED ADULT NURSE NOTE - OBJECTIVE STATEMENT
fever/chills 2 days ago, mcrae cath on and off for over a month for urinary retention, last reinsertion 2 days ago

## 2021-08-22 NOTE — H&P ADULT - HISTORY OF PRESENT ILLNESS
This is a 66 y/o M with PMH of HTN, DM type 2, Dyslipidemia, MS with right sided lower extremity weakness, Latent TB on therapy, recently diagnosed BPH with urinary retention with indwelling urinary catheter, Insomnia, and Anxiety who was called to the ED for a positive blood culture. Patient was seen at [facility] ED the day before yesterday for fever & chills, his Coker's cath was changed and he was sent home on bactrim for UTI after urine culture & blood culture X2 were obtained by ED team. Today his preliminary culture result was positive for E-Coli so he was called to come back to the ED. Patient reports intermittent fever & chills with nausea, last temp was 100.0 this am, had a single vomiting episode 2 days ago after he left the ED, unremarkable vomitus, no vomiting since then. Patient was diagnosed with MS in February, and was found to have latent TB on routine w/u prior to starting his MS treatment, and was started on TB meds.

## 2021-08-22 NOTE — ED PROVIDER NOTE - SKIN, MLM
Skin normal color for race, warm, dry. No evidence of rash. Superficial abrasions to R shin and R knee.

## 2021-08-22 NOTE — H&P ADULT - PROBLEM SELECTOR PLAN 3
Possibly related to Bactrim DS use, bacteremia also is a possible factor, on Rifampin, monitor LFTs.

## 2021-08-22 NOTE — H&P ADULT - NSHPPHYSICALEXAM_GEN_ALL_CORE
-    Vital Signs Last 24 Hrs  T(C): 36.9 (22 Aug 2021 20:50), Max: 37.1 (22 Aug 2021 19:05)  T(F): 98.5 (22 Aug 2021 20:50), Max: 98.8 (22 Aug 2021 19:05)  HR: 84 (22 Aug 2021 20:50) (84 - 92)  BP: 132/74 (22 Aug 2021 20:50) (124/74 - 138/75)  BP(mean): --  RR: 16 (22 Aug 2021 20:50) (16 - 18)  SpO2: 99% (22 Aug 2021 20:50) (96% - 100%)        PHYSICAL EXAM:  		  GENERAL: NAD, well-groomed, well-developed.  HEAD:  Atraumatic, Norm cephalic.  EYES: PERRLA, conjunctiva clear.  ENMT: no nasal discharge, MMM.   NECK: Supple, No JVD.  NERVOUS SYSTEM:  Alert & oriented X3, right leg can be elevated to 30 degrees only, has to use a cane, (+) decreased sensation & numbness allover the whole right side, otherwise neurologically intact.  CHEST/LUNG: Good air entry B/L, no rales, rhonchi, or wheezing.  HEART: Normal S1 & S2, no murmurs, or extra sounds.  ABDOMEN: Soft, non-tender, non-distended; bowel sounds present, no palpable masses or organomegaly.  EXTREMITIES:  No clubbing, cyanosis, or edema.  VASCULAR: 2+ radial, DPA / PTA pulses B/L.  SKIN: No rashes or lesions.  PSYCH: normal affect & behavior.

## 2021-08-22 NOTE — ED PROVIDER NOTE - CARE PLAN
Principal Discharge DX:	E coli bacteremia  Secondary Diagnosis:	Liver hemangioma  Secondary Diagnosis:	Lung nodule  Secondary Diagnosis:	Pyelonephritis   1

## 2021-08-22 NOTE — H&P ADULT - PROBLEM SELECTOR PLAN 6
only on Rifampin which sounds not true, will continue on Rifampin 600 mg PO daily, please confirm in am if he is on other TB meds.

## 2021-08-22 NOTE — ED PROVIDER NOTE - ATTENDING CONTRIBUTION TO CARE
Pt is a 66 yo male who presents to the ED with positive blood cultures. PMHx of anxiety, latent tuberculosis, MS, BPH, HLD, DM.  Pt was seen in the ED on 8/20. At that time he had been experiencing fevers T max of 101.8 for the last 2-3 days. Pt was elv and was sent home with po Bactrim for presumed UtI. Pt blood cultures have since grown E. coli and pt was called and told to return to the ED. Of note pt is currently being treated for latent TB and is on month 2 of treatment. He will then begin treatment for MS at completion. Pt reports continued chills, weakness, and decreased appetite with nausea. Denies fevers since discharge, V/D/C, CP, SOB, cough. Pt did have one episode of vomiting after he was seen in the ED. Coker was changed on 8/20.  On exam pt lying in bed NAD, NCAT, PERRL, EOMI, heart RR, lungs CTA, abd soft NT/ND. Coker noted.  Pt presenting with bacteremia in the setting of recently diagnosed UTI. Will repeat septic work up including cultures, begin abx, and admit

## 2021-08-22 NOTE — PATIENT PROFILE ADULT - STATED REASON FOR ADMISSION
Per wife patient was unable to urinate for 5 days, had fever last friday 101.8F. Had seen his Urologist gave prostate medicines, placed a new mcrae catheter but pt still had difficulty voiding.

## 2021-08-22 NOTE — H&P ADULT - PROBLEM SELECTOR PLAN 5
controlled, already received his weekly Dulaglutide, Empagliflozin is non formulary, hold Metformin as he got IV contrast at the CT prior to admission, continue on Glargine 64 units at bedtime, in addition to corrective insulin Lispro scale coverage before meals & at bedtime, will check glycohemoglobin level in am.

## 2021-08-22 NOTE — H&P ADULT - NSHPREVIEWOFSYSTEMS_GEN_ALL_CORE
-    CONSTITUTIONAL: (+) intermittent fever & chills.  EYES: No eye pain, visual disturbances, or discharge.  ENMT:  No difficulty hearing, vertigo, sinus or throat pain.  NECK: No pain or stiffness.	  RESPIRATORY: No cough, wheezing, or hemoptysis; No shortness of breath.  CARDIOVASCULAR: No chest pain, palpitations, dizziness, or leg swelling.  GASTROINTESTINAL: No abdominal pain, (+) nausea, no current vomiting, no hematemesis; No diarrhea or Change in bowel habits. No melena or hematochezia.  GENITOURINARY: No hematuria, (+) Coker's cath.  NEUROLOGICAL: No headaches, new focal muscle weakness, new numbness, or tremors.  SKIN: No itching, burning or rashes.  MUSCULOSKELETAL: No joint swelling or pain.  PSYCHIATRIC: No depression, anxiety, or agitation.  HEME/LYMPH: No easy bruising, bleeding gums, or nose bleed.  ALLERGY AND IMMUNOLOGIC: No hives or eczema.

## 2021-08-22 NOTE — H&P ADULT - NSHPSOCIALHISTORY_GEN_ALL_CORE
-    , was working in [employer], currently disabled because of MS, some day current smoker, no ETOH or drug abuse. -    , was working in [employer], currently disabled because of MS, current everyday smoker, 1 PPD, no ETOH or drug abuse.

## 2021-08-22 NOTE — H&P ADULT - PROBLEM SELECTOR PLAN 7
following up with his urologist, plan for prostate surgery, Coker's cath was changed 2 days ago, continue Tamsulosin 0.8 mg at bedtime.

## 2021-08-22 NOTE — ED PROVIDER NOTE - OBJECTIVE STATEMENT
66 y/o M with a PMHx of anxiety, latent tuberculosis, MS, BPH, HLD, DM returned to ER after being found to have positive blood culture. As per chart review, pt seen here on 8/20 c/o fever and chills as high as 101.8 x2-3 days. Pt was sent home on bactrim for presumed UTI. However, was found to have E. coli growth in the aerobic & anaerobic bottles drawn on 8/20 and was called to return to ER. Of note, pt is currently being treated for latent TB, is currently on month 2 out of 4 months of treatment and states will begin treatment for MS after TB treatment. Today, pt denies cough, SOB, Diarrhea, back pain, fever but still reports chills, decrease appetite, nausea. He also reports one episode of vomiting after leaving ED on 8/20. Pt has been following w/ urology for urinary retention and has a mcrae which has been in place for 3 weeks, last changed in this ED on 8/20. Pt is vaccinated w/ Moderna for COVID, last dose in March. PCP: Dr. Samuel, Urology: Dr. Steward.

## 2021-08-22 NOTE — H&P ADULT - NSHPLABSRESULTS_GEN_ALL_CORE
-        134<L>  |  98  |  14  ----------------------------<  210<H>  3.5   |  28  |  1.03    Ca    8.5      22 Aug 2021 17:24    TPro  7.6  /  Alb  2.9<L>  /  TBili  2.4<H>  /  DBili  x   /  AST  87<H>  /  ALT  77<H>  /  AlkPhos  163<H>                            12.7   5.48  )-----------( 143      ( 22 Aug 2021 17:24 )             37.4         LIVER FUNCTIONS - ( 22 Aug 2021 17:24 )  Alb: 2.9 g/dL / Pro: 7.6 g/dL / ALK PHOS: 163 U/L / ALT: 77 U/L DA / AST: 87 U/L / GGT: x           PT/INR - ( 22 Aug 2021 17:24 )   PT: 16.5 sec;   INR: 1.38 ratio         PTT - ( 22 Aug 2021 17:24 )  PTT:31.6 sec  Urinalysis Basic - ( 22 Aug 2021 17:24 )    Color: Yellow / Appearance: Clear / S.005 / pH: x  Gluc: x / Ketone: Negative  / Bili: Small / Urobili: 4 mg/dL   Blood: x / Protein: 30 mg/dL / Nitrite: Negative   Leuk Esterase: Small / RBC: 6-10 /HPF / WBC 3-5   Sq Epi: x / Non Sq Epi: Few / Bacteria: Few    Lactate, Blood (21 @ 17:24)   Lactate, Blood: 0.9 mmol/L     COVID-19 PCR: NotDetec (22 Aug 2021 17:30)  COVID-19 PCR: NotDetec (20 Aug 2021 19:27)      Culture - Urine (21 @ 23:21)   Specimen Source: Clean Catch Clean Catch (Midstream)   Culture Results:   50,000 - 99,000 CFU/mL Escherichia coli     Culture - Blood (21 @ 23:18)   - Escherichia coli: Detec   Gram Stain:   Growth in anaerobic bottle: Gram Negative Rods           CT ABDOMEN AND PELVIS IC & CT CHEST IC :                       PROCEDURE DATE:  2021    INTERPRETATION:  CLINICAL INFORMATION: Elevated LFTs, bacteremia. Evaluate for source. It is  COMPARISON: None.  IMPRESSION:  1. Findings suspicious for bilateral pyelonephritis, ureteritis and possibly cystitis. Correlation with urinalysis is recommended.  2. Circumferential wall thickening of the proximal duodenum, which could be related to underdistention. No associated inflammatory changes. Clinical correlation is recommended.  3. 9 mm focus of hyperenhancement in the posterior right hepatic lobe is indeterminate, possibly flash filling hemangioma. Follow-up MRI is recommended for further evaluation.  4. Splenomegaly.  5. Mild emphysema. 4 mm nodule in the right lower lobe. Optional follow-up chest CT in 12 months can be obtained to assess for stability.      CXR:    As per my review shows emphysematous chest, normal cardiac shadow size, clear lung fields B/L, no pulmonary infiltrates, pleural effusion, or pneumothorax. Pending official report.           EKG:    As per my review shows SR at 85/min, normal UT & prolonged QTc intervals, complete RBBB, possible old anterolateral infarct, normal QRS voltage and axis (+30), with normal transition, no ST-T abnormality.      -

## 2021-08-22 NOTE — ED ADULT NURSE REASSESSMENT NOTE - NS ED NURSE REASSESS COMMENT FT1
report received from off going RN, assumed pt care, Pt aware of plan of care, pending CT, plan to be admitted, pt resting comfortably in bed, resp even unlabored, denies pain or complaints. will cont to monitor

## 2021-08-22 NOTE — H&P ADULT - PROBLEM SELECTOR PLAN 4
was normal 2 days ago, ML related to bactrim use, bacteremia also is a possible factor, no recent Heparin use, continue his low dose Aspirin, monitor platelet count.

## 2021-08-22 NOTE — H&P ADULT - ASSESSMENT
66 y/o M with PMH of HTN, DM type 2, Dyslipidemia, MS with right sided lower extremity weakness, Latent TB on therapy, recently diagnosed BPH with urinary retention with indwelling urinary catheter, Insomnia, and Anxiety presented with a positive blood & urine cultures.

## 2021-08-22 NOTE — ED POST DISCHARGE NOTE - ADDITIONAL DOCUMENTATION
spouse called back, pt still having chills and night sweats, taking abx but not feeling that much  better. Advised retun to ED for IV abx and re-eval

## 2021-08-23 DIAGNOSIS — Z29.9 ENCOUNTER FOR PROPHYLACTIC MEASURES, UNSPECIFIED: ICD-10-CM

## 2021-08-23 DIAGNOSIS — E11.9 TYPE 2 DIABETES MELLITUS WITHOUT COMPLICATIONS: ICD-10-CM

## 2021-08-23 DIAGNOSIS — R78.81 BACTEREMIA: ICD-10-CM

## 2021-08-23 DIAGNOSIS — N40.1 BENIGN PROSTATIC HYPERPLASIA WITH LOWER URINARY TRACT SYMPTOMS: ICD-10-CM

## 2021-08-23 DIAGNOSIS — G47.00 INSOMNIA, UNSPECIFIED: ICD-10-CM

## 2021-08-23 DIAGNOSIS — R94.5 ABNORMAL RESULTS OF LIVER FUNCTION STUDIES: ICD-10-CM

## 2021-08-23 DIAGNOSIS — Z22.7 LATENT TUBERCULOSIS: ICD-10-CM

## 2021-08-23 DIAGNOSIS — N12 TUBULO-INTERSTITIAL NEPHRITIS, NOT SPECIFIED AS ACUTE OR CHRONIC: ICD-10-CM

## 2021-08-23 DIAGNOSIS — D69.6 THROMBOCYTOPENIA, UNSPECIFIED: ICD-10-CM

## 2021-08-23 PROBLEM — A15.9 RESPIRATORY TUBERCULOSIS UNSPECIFIED: Chronic | Status: ACTIVE | Noted: 2021-08-20

## 2021-08-23 PROBLEM — F41.9 ANXIETY DISORDER, UNSPECIFIED: Chronic | Status: ACTIVE | Noted: 2021-08-20

## 2021-08-23 LAB
-  AMIKACIN: SIGNIFICANT CHANGE UP
-  AMIKACIN: SIGNIFICANT CHANGE UP
-  AMOXICILLIN/CLAVULANIC ACID: SIGNIFICANT CHANGE UP
-  AMPICILLIN/SULBACTAM: SIGNIFICANT CHANGE UP
-  AMPICILLIN/SULBACTAM: SIGNIFICANT CHANGE UP
-  AMPICILLIN: SIGNIFICANT CHANGE UP
-  AMPICILLIN: SIGNIFICANT CHANGE UP
-  AZTREONAM: SIGNIFICANT CHANGE UP
-  AZTREONAM: SIGNIFICANT CHANGE UP
-  CEFAZOLIN: SIGNIFICANT CHANGE UP
-  CEFAZOLIN: SIGNIFICANT CHANGE UP
-  CEFEPIME: SIGNIFICANT CHANGE UP
-  CEFEPIME: SIGNIFICANT CHANGE UP
-  CEFOXITIN: SIGNIFICANT CHANGE UP
-  CEFOXITIN: SIGNIFICANT CHANGE UP
-  CEFTRIAXONE: SIGNIFICANT CHANGE UP
-  CEFTRIAXONE: SIGNIFICANT CHANGE UP
-  CIPROFLOXACIN: SIGNIFICANT CHANGE UP
-  CIPROFLOXACIN: SIGNIFICANT CHANGE UP
-  ERTAPENEM: SIGNIFICANT CHANGE UP
-  ERTAPENEM: SIGNIFICANT CHANGE UP
-  GENTAMICIN: SIGNIFICANT CHANGE UP
-  GENTAMICIN: SIGNIFICANT CHANGE UP
-  IMIPENEM: SIGNIFICANT CHANGE UP
-  IMIPENEM: SIGNIFICANT CHANGE UP
-  LEVOFLOXACIN: SIGNIFICANT CHANGE UP
-  LEVOFLOXACIN: SIGNIFICANT CHANGE UP
-  MEROPENEM: SIGNIFICANT CHANGE UP
-  MEROPENEM: SIGNIFICANT CHANGE UP
-  NITROFURANTOIN: SIGNIFICANT CHANGE UP
-  PIPERACILLIN/TAZOBACTAM: SIGNIFICANT CHANGE UP
-  PIPERACILLIN/TAZOBACTAM: SIGNIFICANT CHANGE UP
-  TIGECYCLINE: SIGNIFICANT CHANGE UP
-  TOBRAMYCIN: SIGNIFICANT CHANGE UP
-  TOBRAMYCIN: SIGNIFICANT CHANGE UP
-  TRIMETHOPRIM/SULFAMETHOXAZOLE: SIGNIFICANT CHANGE UP
-  TRIMETHOPRIM/SULFAMETHOXAZOLE: SIGNIFICANT CHANGE UP
A1C WITH ESTIMATED AVERAGE GLUCOSE RESULT: 6.8 % — HIGH (ref 4–5.6)
ALBUMIN SERPL ELPH-MCNC: 2.6 G/DL — LOW (ref 3.3–5)
ALP SERPL-CCNC: 150 U/L — HIGH (ref 30–120)
ALT FLD-CCNC: 60 U/L DA — SIGNIFICANT CHANGE UP (ref 10–60)
ANION GAP SERPL CALC-SCNC: 9 MMOL/L — SIGNIFICANT CHANGE UP (ref 5–17)
AST SERPL-CCNC: 52 U/L — HIGH (ref 10–40)
BASOPHILS # BLD AUTO: 0.01 K/UL — SIGNIFICANT CHANGE UP (ref 0–0.2)
BASOPHILS NFR BLD AUTO: 0.2 % — SIGNIFICANT CHANGE UP (ref 0–2)
BILIRUB SERPL-MCNC: 0.7 MG/DL — SIGNIFICANT CHANGE UP (ref 0.2–1.2)
BUN SERPL-MCNC: 10 MG/DL — SIGNIFICANT CHANGE UP (ref 7–23)
CALCIUM SERPL-MCNC: 7.9 MG/DL — LOW (ref 8.4–10.5)
CHLORIDE SERPL-SCNC: 101 MMOL/L — SIGNIFICANT CHANGE UP (ref 96–108)
CO2 SERPL-SCNC: 27 MMOL/L — SIGNIFICANT CHANGE UP (ref 22–31)
COVID-19 SPIKE DOMAIN AB INTERP: POSITIVE
COVID-19 SPIKE DOMAIN ANTIBODY RESULT: >250 U/ML — HIGH
CREAT SERPL-MCNC: 0.83 MG/DL — SIGNIFICANT CHANGE UP (ref 0.5–1.3)
CULTURE RESULTS: NO GROWTH — SIGNIFICANT CHANGE UP
CULTURE RESULTS: SIGNIFICANT CHANGE UP
CULTURE RESULTS: SIGNIFICANT CHANGE UP
EOSINOPHIL # BLD AUTO: 0.1 K/UL — SIGNIFICANT CHANGE UP (ref 0–0.5)
EOSINOPHIL NFR BLD AUTO: 2 % — SIGNIFICANT CHANGE UP (ref 0–6)
ESTIMATED AVERAGE GLUCOSE: 148 MG/DL — HIGH (ref 68–114)
GLUCOSE SERPL-MCNC: 106 MG/DL — HIGH (ref 70–99)
HCT VFR BLD CALC: 34.7 % — LOW (ref 39–50)
HGB BLD-MCNC: 11.7 G/DL — LOW (ref 13–17)
IMM GRANULOCYTES NFR BLD AUTO: 0.2 % — SIGNIFICANT CHANGE UP (ref 0–1.5)
INR BLD: 1.38 RATIO — HIGH (ref 0.88–1.16)
LYMPHOCYTES # BLD AUTO: 1.01 K/UL — SIGNIFICANT CHANGE UP (ref 1–3.3)
LYMPHOCYTES # BLD AUTO: 20.5 % — SIGNIFICANT CHANGE UP (ref 13–44)
MCHC RBC-ENTMCNC: 28 PG — SIGNIFICANT CHANGE UP (ref 27–34)
MCHC RBC-ENTMCNC: 33.7 GM/DL — SIGNIFICANT CHANGE UP (ref 32–36)
MCV RBC AUTO: 83 FL — SIGNIFICANT CHANGE UP (ref 80–100)
METHOD TYPE: SIGNIFICANT CHANGE UP
METHOD TYPE: SIGNIFICANT CHANGE UP
MONOCYTES # BLD AUTO: 0.67 K/UL — SIGNIFICANT CHANGE UP (ref 0–0.9)
MONOCYTES NFR BLD AUTO: 13.6 % — SIGNIFICANT CHANGE UP (ref 2–14)
NEUTROPHILS # BLD AUTO: 3.12 K/UL — SIGNIFICANT CHANGE UP (ref 1.8–7.4)
NEUTROPHILS NFR BLD AUTO: 63.5 % — SIGNIFICANT CHANGE UP (ref 43–77)
NRBC # BLD: 0 /100 WBCS — SIGNIFICANT CHANGE UP (ref 0–0)
ORGANISM # SPEC MICROSCOPIC CNT: SIGNIFICANT CHANGE UP
PLATELET # BLD AUTO: 136 K/UL — LOW (ref 150–400)
POTASSIUM SERPL-MCNC: 3.6 MMOL/L — SIGNIFICANT CHANGE UP (ref 3.5–5.3)
POTASSIUM SERPL-SCNC: 3.6 MMOL/L — SIGNIFICANT CHANGE UP (ref 3.5–5.3)
PROT SERPL-MCNC: 6.7 G/DL — SIGNIFICANT CHANGE UP (ref 6–8.3)
PROTHROM AB SERPL-ACNC: 16.5 SEC — HIGH (ref 10.6–13.6)
RBC # BLD: 4.18 M/UL — LOW (ref 4.2–5.8)
RBC # FLD: 14.7 % — HIGH (ref 10.3–14.5)
SARS-COV-2 IGG+IGM SERPL QL IA: >250 U/ML — HIGH
SARS-COV-2 IGG+IGM SERPL QL IA: POSITIVE
SODIUM SERPL-SCNC: 137 MMOL/L — SIGNIFICANT CHANGE UP (ref 135–145)
SPECIMEN SOURCE: SIGNIFICANT CHANGE UP
WBC # BLD: 4.92 K/UL — SIGNIFICANT CHANGE UP (ref 3.8–10.5)
WBC # FLD AUTO: 4.92 K/UL — SIGNIFICANT CHANGE UP (ref 3.8–10.5)

## 2021-08-23 PROCEDURE — 99233 SBSQ HOSP IP/OBS HIGH 50: CPT

## 2021-08-23 RX ORDER — INSULIN GLARGINE 100 [IU]/ML
52 INJECTION, SOLUTION SUBCUTANEOUS AT BEDTIME
Refills: 0 | Status: DISCONTINUED | OUTPATIENT
Start: 2021-08-23 | End: 2021-08-24

## 2021-08-23 RX ORDER — CLONAZEPAM 1 MG
0.5 TABLET ORAL EVERY 12 HOURS
Refills: 0 | Status: DISCONTINUED | OUTPATIENT
Start: 2021-08-23 | End: 2021-08-25

## 2021-08-23 RX ORDER — NICOTINE POLACRILEX 2 MG
1 GUM BUCCAL AT BEDTIME
Refills: 0 | Status: DISCONTINUED | OUTPATIENT
Start: 2021-08-23 | End: 2021-08-24

## 2021-08-23 RX ORDER — CEFTRIAXONE 500 MG/1
1000 INJECTION, POWDER, FOR SOLUTION INTRAMUSCULAR; INTRAVENOUS EVERY 24 HOURS
Refills: 0 | Status: DISCONTINUED | OUTPATIENT
Start: 2021-08-23 | End: 2021-08-25

## 2021-08-23 RX ADMIN — SERTRALINE 100 MILLIGRAM(S): 25 TABLET, FILM COATED ORAL at 12:20

## 2021-08-23 RX ADMIN — MIRTAZAPINE 7.5 MILLIGRAM(S): 45 TABLET, ORALLY DISINTEGRATING ORAL at 22:48

## 2021-08-23 RX ADMIN — INSULIN GLARGINE 64 UNIT(S): 100 INJECTION, SOLUTION SUBCUTANEOUS at 00:00

## 2021-08-23 RX ADMIN — TAMSULOSIN HYDROCHLORIDE 0.8 MILLIGRAM(S): 0.4 CAPSULE ORAL at 22:48

## 2021-08-23 RX ADMIN — SODIUM CHLORIDE 150 MILLILITER(S): 9 INJECTION, SOLUTION INTRAVENOUS at 00:37

## 2021-08-23 RX ADMIN — CEFTRIAXONE 100 MILLIGRAM(S): 500 INJECTION, POWDER, FOR SOLUTION INTRAMUSCULAR; INTRAVENOUS at 16:36

## 2021-08-23 RX ADMIN — Medication 81 MILLIGRAM(S): at 12:25

## 2021-08-23 RX ADMIN — Medication 0.5 MILLIGRAM(S): at 14:28

## 2021-08-23 RX ADMIN — Medication 1 PATCH: at 07:14

## 2021-08-23 RX ADMIN — Medication 1 TABLET(S): at 23:49

## 2021-08-23 RX ADMIN — Medication 1 PATCH: at 00:38

## 2021-08-23 RX ADMIN — SODIUM CHLORIDE 150 MILLILITER(S): 9 INJECTION, SOLUTION INTRAVENOUS at 05:26

## 2021-08-23 RX ADMIN — Medication 1 TABLET(S): at 10:07

## 2021-08-23 RX ADMIN — INSULIN GLARGINE 52 UNIT(S): 100 INJECTION, SOLUTION SUBCUTANEOUS at 22:48

## 2021-08-23 RX ADMIN — Medication 1 PATCH: at 18:18

## 2021-08-23 RX ADMIN — Medication 1 MILLIGRAM(S): at 22:59

## 2021-08-23 RX ADMIN — ENOXAPARIN SODIUM 40 MILLIGRAM(S): 100 INJECTION SUBCUTANEOUS at 12:20

## 2021-08-23 RX ADMIN — CYCLOBENZAPRINE HYDROCHLORIDE 5 MILLIGRAM(S): 10 TABLET, FILM COATED ORAL at 22:48

## 2021-08-23 RX ADMIN — Medication 6: at 17:38

## 2021-08-23 RX ADMIN — TAMSULOSIN HYDROCHLORIDE 0.8 MILLIGRAM(S): 0.4 CAPSULE ORAL at 00:17

## 2021-08-23 NOTE — PROGRESS NOTE ADULT - PROBLEM SELECTOR PLAN 5
controlled, already received his weekly Dulaglutide, Empagliflozin is non formulary, hold Metformin as he got IV contrast at the CT prior to admission,  continue Lantus and Lispro coverage scale  f/u A1C

## 2021-08-23 NOTE — PROGRESS NOTE ADULT - SUBJECTIVE AND OBJECTIVE BOX
Patient is a 65y old  Male who presents with a chief complaint of Was called back for a positive blood culture. (22 Aug 2021 22:36)    INTERVAL HPI/OVERNIGHT EVENTS: feeling better.  denies pain or fever.  tolerating diet.     MEDICATIONS  (STANDING):  aspirin enteric coated 81 milliGRAM(s) Oral daily  calcium carbonate 1250 mG  + Vitamin D (OsCal 500 + D) 1 Tablet(s) Oral two times a day  cefTRIAXone   IVPB 1000 milliGRAM(s) IV Intermittent every 24 hours  clonazePAM  Tablet 1 milliGRAM(s) Oral at bedtime  cyclobenzaprine 5 milliGRAM(s) Oral at bedtime  dextrose 40% Gel 15 Gram(s) Oral once  dextrose 5%. 1000 milliLiter(s) (50 mL/Hr) IV Continuous <Continuous>  dextrose 5%. 1000 milliLiter(s) (100 mL/Hr) IV Continuous <Continuous>  dextrose 50% Injectable 25 Gram(s) IV Push once  dextrose 50% Injectable 12.5 Gram(s) IV Push once  dextrose 50% Injectable 25 Gram(s) IV Push once  enoxaparin Injectable 40 milliGRAM(s) SubCutaneous daily  glucagon  Injectable 1 milliGRAM(s) IntraMuscular once  insulin glargine Injectable (LANTUS) 52 Unit(s) SubCutaneous at bedtime  insulin lispro (ADMELOG) corrective regimen sliding scale   SubCutaneous three times a day before meals  insulin lispro (ADMELOG) corrective regimen sliding scale   SubCutaneous at bedtime  mirtazapine 7.5 milliGRAM(s) Oral at bedtime  nicotine -  14 mG/24Hr(s) Patch 1 patch Transdermal at bedtime  rifAMPin 600 milliGRAM(s) Oral daily  sertraline 100 milliGRAM(s) Oral daily  tamsulosin 0.8 milliGRAM(s) Oral at bedtime    MEDICATIONS  (PRN):  zolpidem 5 milliGRAM(s) Oral at bedtime PRN Insomnia  zolpidem 5 milliGRAM(s) Oral at bedtime PRN Insomnia      Allergies  No Known Allergies    REVIEW OF SYSTEMS:  CONSTITUTIONAL: No fever, weight loss, or fatigue  EYES: No eye pain, visual disturbances, or discharge  ENMT:  No difficulty hearing, tinnitus, vertigo; No sinus or throat pain  NECK: No pain or stiffness  BREASTS: No pain, masses, or nipple discharge  RESPIRATORY: No cough, wheezing, chills or hemoptysis; No shortness of breath  CARDIOVASCULAR: No chest pain, palpitations, lightheadedness, or leg swelling  GASTROINTESTINAL: No abdominal or epigastric pain. No nausea, vomiting, or hematemesis; No diarrhea or constipation. No melena or hematochezia.  GENITOURINARY: chronic mcrae  NEUROLOGICAL: No headaches, memory loss, vertigo, loss of strength, numbness, or tremors  SKIN: No itching, burning, rashes, or lesions   LYMPH NODES: No enlarged glands  ENDOCRINE: No heat or cold intolerance; No hair loss; No polydipsia or polyuria  MUSCULOSKELETAL: No joint pain or swelling; No muscle, back, or extremity pain  PSYCHIATRIC: No depression, anxiety, or mood swings  HEME/LYMPH: No easy bruising, or bleeding gums  ALLERGY AND IMMUNOLOGIC: No hives or eczema    Vital Signs Last 24 Hrs  T(C): 36.8 (23 Aug 2021 06:00), Max: 37.7 (22 Aug 2021 23:00)  T(F): 98.3 (23 Aug 2021 06:00), Max: 99.9 (22 Aug 2021 23:00)  HR: 88 (23 Aug 2021 06:00) (84 - 95)  BP: 138/92 (23 Aug 2021 06:00) (124/74 - 138/92)  BP(mean): --  RR: 18 (23 Aug 2021 06:00) (16 - 18)  SpO2: 94% (23 Aug 2021 06:00) (94% - 100%)    PHYSICAL EXAM:  GENERAL: NAD, well-groomed, well-developed  HEAD:  Atraumatic, Normocephalic  EYES:  conjunctiva and sclera clear  ENMT: Moist mucous membranes, Good dentition, No lesions; No tonsillar erythema, exudates, or enlargement  NECK: Supple, No JVD  NERVOUS SYSTEM:  Alert & Oriented X3, Good concentration; All 4 extremities mobile, no gross sensory deficits.   CHEST/LUNG: Clear to auscultation bilaterally; No rales, rhonchi, wheezing, or rubs  HEART: Regular rate and rhythm; No murmurs, rubs, or gallops  ABDOMEN: Soft, Nontender, Nondistended; Bowel sounds present  EXTREMITIES:  2+ Peripheral Pulses, No clubbing, cyanosis, or edema  LYMPH: No lymphadenopathy noted  SKIN: No rashes or lesions    LABS:                        11.7   4.92  )-----------( 136      ( 23 Aug 2021 05:55 )             34.7     23 Aug 2021 05:55    137    |  101    |  10     ----------------------------<  106    3.6     |  27     |  0.83     Ca    7.9        23 Aug 2021 05:55    TPro  6.7    /  Alb  2.6    /  TBili  0.7    /  DBili  x      /  AST  52     /  ALT  60     /  AlkPhos  150    23 Aug 2021 05:55    PT/INR - ( 23 Aug 2021 05:55 )   PT: 16.5 sec;   INR: 1.38 ratio    PTT - ( 22 Aug 2021 17:24 )  PTT:31.6 sec    Urinalysis Basic - ( 22 Aug 2021 17:24 )  Color: Yellow / Appearance: Clear / S.005 / pH: x  Gluc: x / Ketone: Negative  / Bili: Small / Urobili: 4 mg/dL   Blood: x / Protein: 30 mg/dL / Nitrite: Negative   Leuk Esterase: Small / RBC: 6-10 /HPF / WBC 3-5   Sq Epi: x / Non Sq Epi: Few / Bacteria: Few      CAPILLARY BLOOD GLUCOSE  POCT Blood Glucose.: 81 mg/dL (23 Aug 2021 08:03)  POCT Blood Glucose.: 202 mg/dL (22 Aug 2021 23:51)      RADIOLOGY & ADDITIONAL TESTS:    Imaging Personally Reviewed:  [ ] YES     Consultant(s) Notes Reviewed:      Care Discussed with Consultants/Other Providers:    Advanced Directives: [ ] DNR  [ ] No feeding tube  [ ] MOLST in chart  [ ] MOLST completed today  [ ] Unknown

## 2021-08-23 NOTE — CONSULT NOTE ADULT - SUBJECTIVE AND OBJECTIVE BOX
Pt seen and examined  Full consult to follow    HPI:  This is a 66 y/o M with PMH of HTN, DM type 2, Dyslipidemia, MS with right sided lower extremity weakness, Latent TB on therapy, recently diagnosed BPH with urinary retention with indwelling urinary catheter, Insomnia, and Anxiety who was called to the ED for a positive blood culture. Patient was seen at [facility] the day before yesterday for fever & chills, his Coker's cath was changed and he was sent home on bactrim for UTI after urine culture & blood culture X2 were obtained by ED team. Today his preliminary culture result was positive for E-Coli so he was called to come back to the ED. Patient reports intermittent fever & chills with nausea, last temp was 100.0 this am, had a single vomiting episode 2 days ago after he left the ED, unremarkable vomitus, no vomiting since then. Patient was diagnosed with MS in February, and was found to have latent TB on routine w/u prior to starting his MS treatment, and was started on TB meds. (22 Aug 2021 22:36)        Past Medical & Surgical Hx:  PAST MEDICAL & SURGICAL HISTORY:  DM (diabetes mellitus)    Hyperlipemia    BPH (benign prostatic hyperplasia)    MS (multiple sclerosis)    Anxiety    Tuberculosis    No significant past surgical history        Social History--  EtOH: denies ***  Tobacco: denies ***  Drug Use: denies ***    Travel/Environmental/Occupational History:  *** inserth T/E/O Hx ***    FAMILY HISTORY:  Family history of diabetes mellitus (DM)        Allergies  No Known Allergies    Intolerances  NONE    Home Medications:  Aspir 81 oral delayed release tablet: 1 tab(s) orally once a day (22 Aug 2021 20:33)  Basaglar KwikPen 100 units/mL subcutaneous solution: 64 unit(s) subcutaneous once a day (22 Aug 2021 20:28)  Calcium 600+D 600 mg-5 mcg (200 intl units) oral tablet: 1 tab(s) orally 2 times a day (22 Aug 2021 20:33)  clonazePAM 1 mg oral tablet: 1 tab(s) orally once a day (at bedtime) (22 Aug 2021 20:33)  cyclobenzaprine 5 mg oral tablet: 1 tab(s) orally once a day (at bedtime) (22 Aug 2021 20:33)  mirtazapine 7.5 mg oral tablet: 1  orally once a day (at bedtime)  15 mg tablet    (22 Aug 2021 20:41)  rifAMPin 300 mg oral capsule: 2 cap(s) orally once a day (22 Aug 2021 20:33)  sertraline 100 mg oral tablet: 1 tab(s) orally once a day (at bedtime) (22 Aug 2021 20:33)  sulfamethoxazole-trimethoprim DS: 2 times a day (22 Aug 2021 20:41)  Synjardy XR 5 mg-1000 mg oral tablet, extended release: 2 tab(s) orally once a day (22 Aug 2021 20:33)  tamsulosin 0.4 mg oral capsule: 2 cap(s) orally once a day (at bedtime) (22 Aug 2021 20:41)  Trulicity Pen 1.5 mg/0.5 mL subcutaneous solution: subcutaneous every 7 days (22 Aug 2021 20:33)  zolpidem 10 mg oral tablet: 1 tab(s) orally once a day (at bedtime) (22 Aug 2021 20:33)      Current Inpatient Medications :    ANTIBIOTICS:   cefTRIAXone   IVPB 1000 milliGRAM(s) IV Intermittent every 24 hours  rifAMPin 600 milliGRAM(s) Oral daily      OTHER RELEVANT MEDICATIONS :  aspirin enteric coated 81 milliGRAM(s) Oral daily  calcium carbonate 1250 mG  + Vitamin D (OsCal 500 + D) 1 Tablet(s) Oral two times a day  clonazePAM  Tablet 0.5 milliGRAM(s) Oral every 12 hours PRN  clonazePAM  Tablet 1 milliGRAM(s) Oral at bedtime  cyclobenzaprine 5 milliGRAM(s) Oral at bedtime  dextrose 40% Gel 15 Gram(s) Oral once  dextrose 5%. 1000 milliLiter(s) IV Continuous <Continuous>  dextrose 5%. 1000 milliLiter(s) IV Continuous <Continuous>  dextrose 50% Injectable 25 Gram(s) IV Push once  dextrose 50% Injectable 12.5 Gram(s) IV Push once  dextrose 50% Injectable 25 Gram(s) IV Push once  enoxaparin Injectable 40 milliGRAM(s) SubCutaneous daily  glucagon  Injectable 1 milliGRAM(s) IntraMuscular once  insulin glargine Injectable (LANTUS) 52 Unit(s) SubCutaneous at bedtime  insulin lispro (ADMELOG) corrective regimen sliding scale   SubCutaneous three times a day before meals  insulin lispro (ADMELOG) corrective regimen sliding scale   SubCutaneous at bedtime  mirtazapine 7.5 milliGRAM(s) Oral at bedtime  sertraline 100 milliGRAM(s) Oral daily  tamsulosin 0.8 milliGRAM(s) Oral at bedtime  zolpidem 5 milliGRAM(s) Oral at bedtime PRN  zolpidem 5 milliGRAM(s) Oral at bedtime PRN      ROS:  Unable to obtain due to :     ROS:  CONSTITUTIONAL:  Negative fever or chills, feels well, good appetite  EYES:  Negative  blurry vision or double vision  CARDIOVASCULAR:  Negative for chest pain or palpitations  RESPIRATORY:  Negative for cough, wheezing, or SOB   GASTROINTESTINAL:  Negative for nausea, vomiting, diarrhea, constipation, or abdominal pain  GENITOURINARY:  Negative frequency, urgency , dysuria or hematuria   NEUROLOGIC:  No headache, confusion, dizziness, lightheadedness  All other systems were reviewed and are negative          I&O's Detail    22 Aug 2021 07:01  -  23 Aug 2021 07:00  --------------------------------------------------------  IN:    Lactated Ringers: 1200 mL    Oral Fluid: 250 mL  Total IN: 1450 mL    OUT:    Voided (mL): 1630 mL  Total OUT: 1630 mL    Total NET: -180 mL      23 Aug 2021 07:01  -  23 Aug 2021 17:33  --------------------------------------------------------  IN:    Lactated Ringers: 750 mL  Total IN: 750 mL    OUT:    Voided (mL): 1000 mL  Total OUT: 1000 mL    Total NET: -250 mL          Physical Exam:  Vital Signs Last 24 Hrs  T(C): 36.8 (23 Aug 2021 14:00), Max: 37.7 (22 Aug 2021 23:00)  T(F): 98.3 (23 Aug 2021 14:00), Max: 99.9 (22 Aug 2021 23:00)  HR: 88 (23 Aug 2021 14:00) (84 - 95)  BP: 126/58 (23 Aug 2021 14:00) (126/58 - 138/92)  BP(mean): --  RR: 18 (23 Aug 2021 14:00) (16 - 18)  SpO2: 96% (23 Aug 2021 14:00) (94% - 100%)  Height (cm): 175.3 (08-22 @ 23:00)  Weight (kg): 79.3 (08-22 @ 23:00)  BMI (kg/m2): 25.8 (08-22 @ 23:00)  BSA (m2): 1.95 (08-22 @ 23:00)    General: well developed well nourished, in no acute distress  Neck: supple, trachea midline  Lungs: clear, no wheeze/rhonchi  Cardiovascular: regular rate and rhythm, S1 S2  Abdomen: soft, nontender, ND, bowel sounds normal  Neurological:  alert and oriented x3  Skin: no rash  Extremities: no cyanosis/clubbing/edema    Labs:       RECENT CULTURES:    Culture - Urine (collected 22 Aug 2021 20:44)  Source: Clean Catch Clean Catch (Midstream)  Final Report (23 Aug 2021 16:08):    No growth    Culture - Urine (collected 20 Aug 2021 23:21)  Source: Clean Catch Clean Catch (Midstream)  Final Report (23 Aug 2021 17:14):    50,000 - 99,000 CFU/mL Escherichia coli  Organism: Escherichia coli (23 Aug 2021 17:14)  Organism: Escherichia coli (23 Aug 2021 17:14)      -  Amikacin: S <=16      -  Amoxicillin/Clavulanic Acid: S <=8/4      -  Ampicillin: S <=8 These ampicillin results predict results for amoxicillin      -  Ampicillin/Sulbactam: S <=4/2 Enterobacter, Citrobacter, and Serratia may develop resistance during prolonged therapy (3-4 days)      -  Aztreonam: S <=4      -  Cefazolin: S <=2 (MIC_CL_COM_ENTERIC_CEFAZU) For uncomplicated UTI with K. pneumoniae, E. coli, or P. mirablis: LENKA <=16 is sensitive and LENKA >=32 is resistant. This also predicts results for oral agents cefaclor, cefdinir, cefpodoxime, cefprozil, cefuroxime axetil, cephalexin and locarbef for uncomplicated UTI. Note that some isolates may be susceptible to these agents while testing resistant to cefazolin.      -  Cefepime: S <=2      -  Cefoxitin: S <=8      -  Ceftriaxone: S <=1 Enterobacter, Citrobacter, and Serratia may develop resistance during prolonged therapy      -  Ciprofloxacin: R >2      -  Ertapenem: S <=0.5      -  Gentamicin: S <=2      -  Imipenem: S <=1      -  Levofloxacin: R >4      -  Meropenem: S <=1      -  Nitrofurantoin: S <=32 Should not be used to treat pyelonephritis      -  Piperacillin/Tazobactam: S <=8      -  Tigecycline: S <=2      -  Tobramycin: S <=2      -  Trimethoprim/Sulfamethoxazole: R >2/38      Method Type: LENKA    Culture - Blood (collected 20 Aug 2021 23:18)  Source: .Blood Blood-Peripheral  Gram Stain (21 Aug 2021 21:21):    Growth in anaerobic bottle: Gram Negative Rods    Growth in aerobic bottle: Gram Negative Rods  Final Report (23 Aug 2021 07:57):    Growth in aerobic and anaerobic bottles: Escherichia coli    ***Blood Panel PCR results on this specimen are available    approximately 3 hours after the Gram stain result.***    Gram stain, PCR, and/or culture results may not always    correspond due to difference in methodologies.    ************************************************************    This PCR assay was performed by multiplex PCR. This    Assay tests for 66 bacterial and resistance gene targets.    Please refer to the [facility] test directory    at [email or web address] for details.  Organism: Blood Culture PCR  Escherichia coli (23 Aug 2021 07:57)  Organism: Escherichia coli (23 Aug 2021 07:57)      -  Amikacin: S <=16      -  Ampicillin: S <=8 These ampicillin results predict results for amoxicillin      -  Ampicillin/Sulbactam: S <=4/2 Enterobacter, Citrobacter, and Serratia may develop resistance during prolonged therapy (3-4 days)      -  Aztreonam: S <=4      -  Cefazolin: S <=2 Enterobacter, Citrobacter, and Serratia may develop resistance during prolonged therapy (3-4 days)      -  Cefepime: S <=2      -  Cefoxitin: S <=8      -  Ceftriaxone: S <=1 Enterobacter, Citrobacter, and Serratia may develop resistance during prolonged therapy      -  Ciprofloxacin: R >2      -  Ertapenem: S <=0.5      -  Gentamicin: S <=2      -  Imipenem: S <=1      -  Levofloxacin: R >4      -  Meropenem: S <=1      -  Piperacillin/Tazobactam: S <=8      -  Tobramycin: S <=2      -  Trimethoprim/Sulfamethoxazole: R >2/38      Method Type: LENKA  Organism: Blood Culture PCR (23 Aug 2021 07:57)      -  Escherichia coli: Detec      Method Type: PCR    Culture - Blood (collected 20 Aug 2021 23:18)  Source: .Blood Blood-Peripheral  Preliminary Report (22 Aug 2021 01:01):    No growth to date.      RADIOLOGY & ADDITIONAL STUDIES:  EXAM:  CT ABDOMEN AND PELVIS IC                          EXAM:  CT CHEST IC                                  PROCEDURE DATE:  08/22/2021          INTERPRETATION:  CLINICAL INFORMATION: Elevated LFTs, bacteremia. Evaluate for source. It is    COMPARISON: None.    CONTRAST/COMPLICATIONS:  IV Contrast: Omnipaque 300 (accession [id number]), IV contrast documented in associated exam (accession [id number])  90 cc administered   10 cc discarded  Oral Contrast: NONE  Complications: None reported at time ofstudy completion    PROCEDURE:  CT of the Chest, Abdomen and Pelvis was performed.  Sagittal and coronal reformats were performed.    FINDINGS:  CHEST:  LUNGS AND LARGE AIRWAYS: Trace linear secretions in the trachea. Otherwise central airways are patent. Mild centrilobular emphysema. Bibasilar dependent atelectasis. 4 mm right lower lobe nodule (4, 98).  PLEURA: No pleural effusion.  VESSELS: Atherosclerotic changes of the aorta.  HEART: Heart size is normal. No pericardial effusion.  MEDIASTINUM AND HALINA: No lymphadenopathy. Nonspecific 9 mm  esophageal node (4, 122).  CHEST WALL AND LOWER NECK: Within normal limits.    ABDOMEN AND PELVIS:  LIVER: Nonspecific focus of 9 mm enhancement in the posterior right hepatic lobe (4, 138).  BILE DUCTS: Normal caliber.  GALLBLADDER: Within normal limits.  SPLEEN: Splenomegaly.  PANCREAS: Within normal limits.  ADRENALS: Within normal limits.  KIDNEYS/URETERS: Mild bilateral perinephric stranding. Right renal cyst and bilateral subcentimeter hypodensities too small to characterize. Heterogeneous enhancement in the left upper pole (example 7, 78), the right upper pole (7, 79) and the medial right interpolar kidney (7, 65). No hydronephrosis or obstructing stone. Mild bilateral ureteral enhancement.    BLADDER: Decompressed by catheter. Diffuse wall thickening, though underdistended.  REPRODUCTIVE ORGANS: Enlarged prostate.    BOWEL: No bowel obstruction. Appendix is normal. Circumferential wall thickening of the proximal duodenum, possibly related to underdistention. No surrounding inflammatory changes.  PERITONEUM: No ascites.  VESSELS: Atherosclerotic changes.  RETROPERITONEUM/LYMPH NODES: No lymphadenopathy.  ABDOMINAL WALL: Within normal limits.  BONES: Chronic left ninth rib fracture deformity. Degenerative changes.    IMPRESSION:    1. Findings suspicious for bilateral pyelonephritis, ureteritis and possibly cystitis. Correlation with urinalysis is recommended.  2. Circumferential wall thickening of the proximal duodenum, whichcould be related to underdistention. No associated inflammatory changes. Clinical correlation is recommended.  3. 9 mm focus of hyperenhancement in the posterior right hepatic lobe is indeterminate, possibly flash filling hemangioma. Follow-up MRI isrecommended for further evaluation.  4. Splenomegaly.  5. Mild emphysema. 4 mm nodule in the right lower lobe. Optional follow-up chest CT in 12 months can be obtained to assess for stability.      Assessment :             Plan :       Continue with present regiment .  Approptiate use of antibiotics and adverse effects reviewed.      I have discussed the above plan of care with patient/family in detail. They expressed understanding of the treatment plan . Risks, benefits and alternatives discussed in detail. I have asked if they have any questions or concerns and appropriately addressed them to the best of my ability .      > 45 minutes spent in direct patient care reviewing  the notes, lab data/ imaging , discussion with multidisciplinary team. All questions were addressed and answered to the best of my capacity .    Thank you for allowing me to participate in the care of your patient .      George Fox MD  Infectious Disease  [phone]     HPI:  This is a 64 y/o M with PMH of HTN, DM type 2, Dyslipidemia, MS with right sided lower extremity weakness, Latent TB on therapy, recently diagnosed BPH with recent urinary retention with indwelling urinary catheter approximately 3 weeks ago seen in ED 8/20 due to fever chills diagnosed with CAUTI and dc on Ceftin now called back sec Ecoli bacteremia. Febrile Tmax 100.3. + n/v. Coker changed in ED.    Infectious Disease consult was called to evaluate pt and for antibiotic management.      Past Medical & Surgical Hx:  PAST MEDICAL & SURGICAL HISTORY:  DM (diabetes mellitus)  Hyperlipemia  BPH (benign prostatic hyperplasia)  MS (multiple sclerosis)  Anxiety  Latent Tuberculosis    Social History--  EtOH: denies   Tobacco: denies  Drug Use: denies      FAMILY HISTORY:  Family history of diabetes mellitus (DM)      Allergies  No Known Allergies    Intolerances  NONE    Home Medications:  Aspir 81 oral delayed release tablet: 1 tab(s) orally once a day (22 Aug 2021 20:33)  Basaglar KwikPen 100 units/mL subcutaneous solution: 64 unit(s) subcutaneous once a day (22 Aug 2021 20:28)  Calcium 600+D 600 mg-5 mcg (200 intl units) oral tablet: 1 tab(s) orally 2 times a day (22 Aug 2021 20:33)  clonazePAM 1 mg oral tablet: 1 tab(s) orally once a day (at bedtime) (22 Aug 2021 20:33)  cyclobenzaprine 5 mg oral tablet: 1 tab(s) orally once a day (at bedtime) (22 Aug 2021 20:33)  mirtazapine 7.5 mg oral tablet: 1  orally once a day (at bedtime)  15 mg tablet    (22 Aug 2021 20:41)  rifAMPin 300 mg oral capsule: 2 cap(s) orally once a day (22 Aug 2021 20:33)  sertraline 100 mg oral tablet: 1 tab(s) orally once a day (at bedtime) (22 Aug 2021 20:33)  sulfamethoxazole-trimethoprim DS: 2 times a day (22 Aug 2021 20:41)  Synjardy XR 5 mg-1000 mg oral tablet, extended release: 2 tab(s) orally once a day (22 Aug 2021 20:33)  tamsulosin 0.4 mg oral capsule: 2 cap(s) orally once a day (at bedtime) (22 Aug 2021 20:41)  Trulicity Pen 1.5 mg/0.5 mL subcutaneous solution: subcutaneous every 7 days (22 Aug 2021 20:33)  zolpidem 10 mg oral tablet: 1 tab(s) orally once a day (at bedtime) (22 Aug 2021 20:33)      Current Inpatient Medications :    ANTIBIOTICS:   cefTRIAXone   IVPB 1000 milliGRAM(s) IV Intermittent every 24 hours  rifAMPin 600 milliGRAM(s) Oral daily      OTHER RELEVANT MEDICATIONS :  aspirin enteric coated 81 milliGRAM(s) Oral daily  calcium carbonate 1250 mG  + Vitamin D (OsCal 500 + D) 1 Tablet(s) Oral two times a day  clonazePAM  Tablet 0.5 milliGRAM(s) Oral every 12 hours PRN  clonazePAM  Tablet 1 milliGRAM(s) Oral at bedtime  cyclobenzaprine 5 milliGRAM(s) Oral at bedtime  dextrose 40% Gel 15 Gram(s) Oral once  dextrose 5%. 1000 milliLiter(s) IV Continuous <Continuous>  dextrose 5%. 1000 milliLiter(s) IV Continuous <Continuous>  dextrose 50% Injectable 25 Gram(s) IV Push once  dextrose 50% Injectable 12.5 Gram(s) IV Push once  dextrose 50% Injectable 25 Gram(s) IV Push once  enoxaparin Injectable 40 milliGRAM(s) SubCutaneous daily  glucagon  Injectable 1 milliGRAM(s) IntraMuscular once  insulin glargine Injectable (LANTUS) 52 Unit(s) SubCutaneous at bedtime  insulin lispro (ADMELOG) corrective regimen sliding scale   SubCutaneous three times a day before meals  insulin lispro (ADMELOG) corrective regimen sliding scale   SubCutaneous at bedtime  mirtazapine 7.5 milliGRAM(s) Oral at bedtime  sertraline 100 milliGRAM(s) Oral daily  tamsulosin 0.8 milliGRAM(s) Oral at bedtime  zolpidem 5 milliGRAM(s) Oral at bedtime PRN  zolpidem 5 milliGRAM(s) Oral at bedtime PRN      ROS:  CONSTITUTIONAL:  Negative fever or chills  EYES:  Negative  blurry vision or double vision  CARDIOVASCULAR:  Negative for chest pain or palpitations  RESPIRATORY:  Negative for cough, wheezing, or SOB   GASTROINTESTINAL:  Negative for nausea, vomiting, diarrhea, constipation, or abdominal pain  GENITOURINARY:  Negative frequency, urgency , dysuria or hematuria   NEUROLOGIC:  No headache, confusion, dizziness, lightheadedness  All other systems were reviewed and are negative      I&O's Detail    22 Aug 2021 07:01  -  23 Aug 2021 07:00  --------------------------------------------------------  IN:    Lactated Ringers: 1200 mL    Oral Fluid: 250 mL  Total IN: 1450 mL    OUT:    Voided (mL): 1630 mL  Total OUT: 1630 mL    Total NET: -180 mL      23 Aug 2021 07:01  -  23 Aug 2021 17:33  --------------------------------------------------------  IN:    Lactated Ringers: 750 mL  Total IN: 750 mL    OUT:    Voided (mL): 1000 mL  Total OUT: 1000 mL    Total NET: -250 mL        Physical Exam:  Vital Signs Last 24 Hrs  T(C): 36.8 (23 Aug 2021 14:00), Max: 37.7 (22 Aug 2021 23:00)  T(F): 98.3 (23 Aug 2021 14:00), Max: 99.9 (22 Aug 2021 23:00)  HR: 88 (23 Aug 2021 14:00) (84 - 95)  BP: 126/58 (23 Aug 2021 14:00) (126/58 - 138/92)  RR: 18 (23 Aug 2021 14:00) (16 - 18)  SpO2: 96% (23 Aug 2021 14:00) (94% - 100%)  Height (cm): 175.3 (08-22 @ 23:00)  Weight (kg): 79.3 (08-22 @ 23:00)  BMI (kg/m2): 25.8 (08-22 @ 23:00)  BSA (m2): 1.95 (08-22 @ 23:00)    General  no acute distress  Neck: supple, trachea midline  Lungs: clear, no wheeze/rhonchi  Cardiovascular: regular rate and rhythm, S1 S2  Abdomen: soft, nontender, ND, bowel sounds normal  Neurological:  alert and oriented x3  Skin: no rash  Extremities: no edema    Labs:                         11.7   4.92  )-----------( 136      ( 23 Aug 2021 05:55 )             34.7   08-23    137  |  101  |  10  ----------------------------<  106<H>  3.6   |  27  |  0.83    Ca    7.9<L>      23 Aug 2021 05:55    TPro  6.7  /  Alb  2.6<L>  /  TBili  0.7  /  DBili  x   /  AST  52<H>  /  ALT  60  /  AlkPhos  150<H>  08-23      RECENT CULTURES:    Culture - Urine (collected 22 Aug 2021 20:44)  Source: Clean Catch Clean Catch (Midstream)  Final Report (23 Aug 2021 16:08):    No growth    Culture - Urine (collected 20 Aug 2021 23:21)  Source: Clean Catch Clean Catch (Midstream)  Final Report (23 Aug 2021 17:14):    50,000 - 99,000 CFU/mL Escherichia coli  Organism: Escherichia coli (23 Aug 2021 17:14)  Organism: Escherichia coli (23 Aug 2021 17:14)      -  Amikacin: S <=16      -  Amoxicillin/Clavulanic Acid: S <=8/4      -  Ampicillin: S <=8 These ampicillin results predict results for amoxicillin      -  Ampicillin/Sulbactam: S <=4/2 Enterobacter, Citrobacter, and Serratia may develop resistance during prolonged therapy (3-4 days)      -  Aztreonam: S <=4      -  Cefazolin: S <=2 (MIC_CL_COM_ENTERIC_CEFAZU) For uncomplicated UTI with K. pneumoniae, E. coli, or P. mirablis: LENKA <=16 is sensitive and LENKA >=32 is resistant. This also predicts results for oral agents cefaclor, cefdinir, cefpodoxime, cefprozil, cefuroxime axetil, cephalexin and locarbef for uncomplicated UTI. Note that some isolates may be susceptible to these agents while testing resistant to cefazolin.      -  Cefepime: S <=2      -  Cefoxitin: S <=8      -  Ceftriaxone: S <=1 Enterobacter, Citrobacter, and Serratia may develop resistance during prolonged therapy      -  Ciprofloxacin: R >2      -  Ertapenem: S <=0.5      -  Gentamicin: S <=2      -  Imipenem: S <=1      -  Levofloxacin: R >4      -  Meropenem: S <=1      -  Nitrofurantoin: S <=32 Should not be used to treat pyelonephritis      -  Piperacillin/Tazobactam: S <=8      -  Tigecycline: S <=2      -  Tobramycin: S <=2      -  Trimethoprim/Sulfamethoxazole: R >2/38      Method Type: LENKA    Culture - Blood (collected 20 Aug 2021 23:18)  Source: .Blood Blood-Peripheral  Gram Stain (21 Aug 2021 21:21):    Growth in anaerobic bottle: Gram Negative Rods    Growth in aerobic bottle: Gram Negative Rods  Final Report (23 Aug 2021 07:57):    Growth in aerobic and anaerobic bottles: Escherichia coli    ***Blood Panel PCR results on this specimen are available    approximately 3 hours after the Gram stain result.***    Gram stain, PCR, and/or culture results may not always    correspond due to difference in methodologies.    ************************************************************    This PCR assay was performed by multiplex PCR. This    Assay tests for 66 bacterial and resistance gene targets.    Please refer to the [facility] test directory    at [email or web address] for details.  Organism: Blood Culture PCR  Escherichia coli (23 Aug 2021 07:57)  Organism: Escherichia coli (23 Aug 2021 07:57)      -  Amikacin: S <=16      -  Ampicillin: S <=8 These ampicillin results predict results for amoxicillin      -  Ampicillin/Sulbactam: S <=4/2 Enterobacter, Citrobacter, and Serratia may develop resistance during prolonged therapy (3-4 days)      -  Aztreonam: S <=4      -  Cefazolin: S <=2 Enterobacter, Citrobacter, and Serratia may develop resistance during prolonged therapy (3-4 days)      -  Cefepime: S <=2      -  Cefoxitin: S <=8      -  Ceftriaxone: S <=1 Enterobacter, Citrobacter, and Serratia may develop resistance during prolonged therapy      -  Ciprofloxacin: R >2      -  Ertapenem: S <=0.5      -  Gentamicin: S <=2      -  Imipenem: S <=1      -  Levofloxacin: R >4      -  Meropenem: S <=1      -  Piperacillin/Tazobactam: S <=8      -  Tobramycin: S <=2      -  Trimethoprim/Sulfamethoxazole: R >2/38      Method Type: LENKA  Organism: Blood Culture PCR (23 Aug 2021 07:57)      -  Escherichia coli: Detec      Method Type: PCR    Culture - Blood (collected 20 Aug 2021 23:18)  Source: .Blood Blood-Peripheral  Preliminary Report (22 Aug 2021 01:01):    No growth to date.      RADIOLOGY & ADDITIONAL STUDIES:  EXAM:  CT ABDOMEN AND PELVIS IC                          EXAM:  CT CHEST IC                                  PROCEDURE DATE:  08/22/2021          INTERPRETATION:  CLINICAL INFORMATION: Elevated LFTs, bacteremia. Evaluate for source. It is    COMPARISON: None.    CONTRAST/COMPLICATIONS:  IV Contrast: Omnipaque 300 (accession [id number]), IV contrast documented in associated exam (accession [id number])  90 cc administered   10 cc discarded  Oral Contrast: NONE  Complications: None reported at time ofstudy completion    PROCEDURE:  CT of the Chest, Abdomen and Pelvis was performed.  Sagittal and coronal reformats were performed.    FINDINGS:  CHEST:  LUNGS AND LARGE AIRWAYS: Trace linear secretions in the trachea. Otherwise central airways are patent. Mild centrilobular emphysema. Bibasilar dependent atelectasis. 4 mm right lower lobe nodule (4, 98).  PLEURA: No pleural effusion.  VESSELS: Atherosclerotic changes of the aorta.  HEART: Heart size is normal. No pericardial effusion.  MEDIASTINUM AND HALINA: No lymphadenopathy. Nonspecific 9 mm  esophageal node (4, 122).  CHEST WALL AND LOWER NECK: Within normal limits.    ABDOMEN AND PELVIS:  LIVER: Nonspecific focus of 9 mm enhancement in the posterior right hepatic lobe (4, 138).  BILE DUCTS: Normal caliber.  GALLBLADDER: Within normal limits.  SPLEEN: Splenomegaly.  PANCREAS: Within normal limits.  ADRENALS: Within normal limits.  KIDNEYS/URETERS: Mild bilateral perinephric stranding. Right renal cyst and bilateral subcentimeter hypodensities too small to characterize. Heterogeneous enhancement in the left upper pole (example 7, 78), the right upper pole (7, 79) and the medial right interpolar kidney (7, 65). No hydronephrosis or obstructing stone. Mild bilateral ureteral enhancement.    BLADDER: Decompressed by catheter. Diffuse wall thickening, though underdistended.  REPRODUCTIVE ORGANS: Enlarged prostate.    BOWEL: No bowel obstruction. Appendix is normal. Circumferential wall thickening of the proximal duodenum, possibly related to underdistention. No surrounding inflammatory changes.  PERITONEUM: No ascites.  VESSELS: Atherosclerotic changes.  RETROPERITONEUM/LYMPH NODES: No lymphadenopathy.  ABDOMINAL WALL: Within normal limits.  BONES: Chronic left ninth rib fracture deformity. Degenerative changes.    IMPRESSION:    1. Findings suspicious for bilateral pyelonephritis, ureteritis and possibly cystitis. Correlation with urinalysis is recommended.  2. Circumferential wall thickening of the proximal duodenum, whichcould be related to underdistention. No associated inflammatory changes. Clinical correlation is recommended.  3. 9 mm focus of hyperenhancement in the posterior right hepatic lobe is indeterminate, possibly flash filling hemangioma. Follow-up MRI isrecommended for further evaluation.  4. Splenomegaly.  5. Mild emphysema. 4 mm nodule in the right lower lobe. Optional follow-up chest CT in 12 months can be obtained to assess for stability.      Assessment :   This is a 64 y/o M with PMH of HTN, DM type 2, Dyslipidemia, MS with right sided lower extremity weakness, Latent TB on therapy, recently diagnosed BPH with recent urinary retention with indwelling urinary catheter approximately 3 weeks ago admitted with Ecoli bacteremia sec CAUTI      Plan :   Cont Rocephin  Fu repeat cultures  Trend temps and cbc  Stable from ID standpoint    Continue with present regiment .  Approptiate use of antibiotics and adverse effects reviewed.      I have discussed the above plan of care with patient/family in detail. They expressed understanding of the treatment plan . Risks, benefits and alternatives discussed in detail. I have asked if they have any questions or concerns and appropriately addressed them to the best of my ability .      > 45 minutes spent in direct patient care reviewing  the notes, lab data/ imaging , discussion with multidisciplinary team. All questions were addressed and answered to the best of my capacity .    Thank you for allowing me to participate in the care of your patient .      George Fox MD  Infectious Disease  [phone]

## 2021-08-24 LAB
ALBUMIN SERPL ELPH-MCNC: 2.4 G/DL — LOW (ref 3.3–5)
ALP SERPL-CCNC: 165 U/L — HIGH (ref 30–120)
ALT FLD-CCNC: 71 U/L DA — HIGH (ref 10–60)
ANION GAP SERPL CALC-SCNC: 4 MMOL/L — LOW (ref 5–17)
AST SERPL-CCNC: 67 U/L — HIGH (ref 10–40)
BASOPHILS # BLD AUTO: 0.02 K/UL — SIGNIFICANT CHANGE UP (ref 0–0.2)
BASOPHILS NFR BLD AUTO: 0.4 % — SIGNIFICANT CHANGE UP (ref 0–2)
BILIRUB SERPL-MCNC: 0.5 MG/DL — SIGNIFICANT CHANGE UP (ref 0.2–1.2)
BUN SERPL-MCNC: 11 MG/DL — SIGNIFICANT CHANGE UP (ref 7–23)
CALCIUM SERPL-MCNC: 8.2 MG/DL — LOW (ref 8.4–10.5)
CHLORIDE SERPL-SCNC: 104 MMOL/L — SIGNIFICANT CHANGE UP (ref 96–108)
CO2 SERPL-SCNC: 33 MMOL/L — HIGH (ref 22–31)
CREAT SERPL-MCNC: 0.67 MG/DL — SIGNIFICANT CHANGE UP (ref 0.5–1.3)
EOSINOPHIL # BLD AUTO: 0.16 K/UL — SIGNIFICANT CHANGE UP (ref 0–0.5)
EOSINOPHIL NFR BLD AUTO: 3.5 % — SIGNIFICANT CHANGE UP (ref 0–6)
GLUCOSE SERPL-MCNC: 74 MG/DL — SIGNIFICANT CHANGE UP (ref 70–99)
HCT VFR BLD CALC: 34.3 % — LOW (ref 39–50)
HCV AB S/CO SERPL IA: 0.15 S/CO — SIGNIFICANT CHANGE UP (ref 0–0.99)
HCV AB SERPL-IMP: SIGNIFICANT CHANGE UP
HGB BLD-MCNC: 11.4 G/DL — LOW (ref 13–17)
IMM GRANULOCYTES NFR BLD AUTO: 0.4 % — SIGNIFICANT CHANGE UP (ref 0–1.5)
LYMPHOCYTES # BLD AUTO: 1.06 K/UL — SIGNIFICANT CHANGE UP (ref 1–3.3)
LYMPHOCYTES # BLD AUTO: 23.2 % — SIGNIFICANT CHANGE UP (ref 13–44)
MCHC RBC-ENTMCNC: 28 PG — SIGNIFICANT CHANGE UP (ref 27–34)
MCHC RBC-ENTMCNC: 33.2 GM/DL — SIGNIFICANT CHANGE UP (ref 32–36)
MCV RBC AUTO: 84.3 FL — SIGNIFICANT CHANGE UP (ref 80–100)
MONOCYTES # BLD AUTO: 0.61 K/UL — SIGNIFICANT CHANGE UP (ref 0–0.9)
MONOCYTES NFR BLD AUTO: 13.4 % — SIGNIFICANT CHANGE UP (ref 2–14)
NEUTROPHILS # BLD AUTO: 2.69 K/UL — SIGNIFICANT CHANGE UP (ref 1.8–7.4)
NEUTROPHILS NFR BLD AUTO: 59.1 % — SIGNIFICANT CHANGE UP (ref 43–77)
NRBC # BLD: 0 /100 WBCS — SIGNIFICANT CHANGE UP (ref 0–0)
PLATELET # BLD AUTO: 147 K/UL — LOW (ref 150–400)
POTASSIUM SERPL-MCNC: 3.1 MMOL/L — LOW (ref 3.5–5.3)
POTASSIUM SERPL-SCNC: 3.1 MMOL/L — LOW (ref 3.5–5.3)
PROT SERPL-MCNC: 6.7 G/DL — SIGNIFICANT CHANGE UP (ref 6–8.3)
RBC # BLD: 4.07 M/UL — LOW (ref 4.2–5.8)
RBC # FLD: 14.6 % — HIGH (ref 10.3–14.5)
SODIUM SERPL-SCNC: 141 MMOL/L — SIGNIFICANT CHANGE UP (ref 135–145)
WBC # BLD: 4.56 K/UL — SIGNIFICANT CHANGE UP (ref 3.8–10.5)
WBC # FLD AUTO: 4.56 K/UL — SIGNIFICANT CHANGE UP (ref 3.8–10.5)

## 2021-08-24 PROCEDURE — 99233 SBSQ HOSP IP/OBS HIGH 50: CPT

## 2021-08-24 RX ORDER — POTASSIUM CHLORIDE 20 MEQ
40 PACKET (EA) ORAL EVERY 4 HOURS
Refills: 0 | Status: COMPLETED | OUTPATIENT
Start: 2021-08-24 | End: 2021-08-24

## 2021-08-24 RX ORDER — INSULIN GLARGINE 100 [IU]/ML
46 INJECTION, SOLUTION SUBCUTANEOUS AT BEDTIME
Refills: 0 | Status: DISCONTINUED | OUTPATIENT
Start: 2021-08-24 | End: 2021-08-25

## 2021-08-24 RX ORDER — NICOTINE POLACRILEX 2 MG
1 GUM BUCCAL DAILY
Refills: 0 | Status: DISCONTINUED | OUTPATIENT
Start: 2021-08-24 | End: 2021-08-25

## 2021-08-24 RX ORDER — MIRTAZAPINE 45 MG/1
15 TABLET, ORALLY DISINTEGRATING ORAL AT BEDTIME
Refills: 0 | Status: DISCONTINUED | OUTPATIENT
Start: 2021-08-24 | End: 2021-08-25

## 2021-08-24 RX ORDER — INSULIN LISPRO 100/ML
3 VIAL (ML) SUBCUTANEOUS
Refills: 0 | Status: DISCONTINUED | OUTPATIENT
Start: 2021-08-24 | End: 2021-08-25

## 2021-08-24 RX ADMIN — Medication 40 MILLIEQUIVALENT(S): at 14:25

## 2021-08-24 RX ADMIN — TAMSULOSIN HYDROCHLORIDE 0.8 MILLIGRAM(S): 0.4 CAPSULE ORAL at 22:16

## 2021-08-24 RX ADMIN — Medication 1 TABLET(S): at 08:27

## 2021-08-24 RX ADMIN — Medication 40 MILLIEQUIVALENT(S): at 12:05

## 2021-08-24 RX ADMIN — Medication 1 MILLIGRAM(S): at 22:16

## 2021-08-24 RX ADMIN — Medication 3 UNIT(S): at 16:58

## 2021-08-24 RX ADMIN — INSULIN GLARGINE 46 UNIT(S): 100 INJECTION, SOLUTION SUBCUTANEOUS at 22:13

## 2021-08-24 RX ADMIN — Medication 2: at 12:28

## 2021-08-24 RX ADMIN — Medication 0.5 MILLIGRAM(S): at 14:24

## 2021-08-24 RX ADMIN — MIRTAZAPINE 15 MILLIGRAM(S): 45 TABLET, ORALLY DISINTEGRATING ORAL at 22:29

## 2021-08-24 RX ADMIN — ENOXAPARIN SODIUM 40 MILLIGRAM(S): 100 INJECTION SUBCUTANEOUS at 12:04

## 2021-08-24 RX ADMIN — SERTRALINE 100 MILLIGRAM(S): 25 TABLET, FILM COATED ORAL at 12:03

## 2021-08-24 RX ADMIN — Medication 81 MILLIGRAM(S): at 12:03

## 2021-08-24 RX ADMIN — CEFTRIAXONE 100 MILLIGRAM(S): 500 INJECTION, POWDER, FOR SOLUTION INTRAMUSCULAR; INTRAVENOUS at 15:46

## 2021-08-24 RX ADMIN — CYCLOBENZAPRINE HYDROCHLORIDE 5 MILLIGRAM(S): 10 TABLET, FILM COATED ORAL at 22:17

## 2021-08-24 RX ADMIN — Medication 1 TABLET(S): at 17:36

## 2021-08-24 NOTE — PROGRESS NOTE ADULT - PROBLEM SELECTOR PLAN 4
was normal 2 days ago, ML related to bactrim use or hydration, bacteremia also is a possible factor, no recent Heparin use, no signs of bleeding, continue his low dose Aspirin, monitor platelet count.
within acceptable amounts of variation. no signs of bleeding.

## 2021-08-24 NOTE — PROGRESS NOTE ADULT - PROBLEM SELECTOR PLAN 2
antibiotic therapy & management as stated above, ID consult.
antibiotic therapy & management as stated above, ID consult.

## 2021-08-24 NOTE — PROGRESS NOTE ADULT - PROBLEM SELECTOR PLAN 9
started him on LMWH 40 mg sub Q daily for DVT prophylaxis.
LMWH 40 mg sub Q daily for DVT prophylaxis.  Encourage ambulation

## 2021-08-24 NOTE — PROGRESS NOTE ADULT - PROBLEM SELECTOR PLAN 8
on multiple meds at home, will continue them all at same doses.
on multiple meds at home, will continue them all at same doses.

## 2021-08-24 NOTE — PROGRESS NOTE ADULT - PROBLEM SELECTOR PLAN 3
possibly due to infection or bactrim.  continue to monitor
improved, possibly due to infection or bactrim.

## 2021-08-24 NOTE — PROGRESS NOTE ADULT - PROBLEM SELECTOR PLAN 7
following up with his urologist, plan for prostate surgery, Coker's cath was changed 2 days ago, continue Tamsulosin 0.8 mg at bedtime.
discussed with Dr Gutierrez.  flomax had been increased and was supposed to have outpatient TOV this week.  will dc mcrae tonight at 11pm for TOV.  monitor bladder volume.

## 2021-08-24 NOTE — PROGRESS NOTE ADULT - SUBJECTIVE AND OBJECTIVE BOX
Patient is a 65y old  Male who presents with a chief complaint of Was called back for a positive blood culture. (23 Aug 2021 17:33)    INTERVAL HPI/OVERNIGHT EVENTS: feeling well, no complaints. tolerating diet.     MEDICATIONS  (STANDING):  aspirin enteric coated 81 milliGRAM(s) Oral daily  calcium carbonate 1250 mG  + Vitamin D (OsCal 500 + D) 1 Tablet(s) Oral two times a day  cefTRIAXone   IVPB 1000 milliGRAM(s) IV Intermittent every 24 hours  clonazePAM  Tablet 1 milliGRAM(s) Oral at bedtime  cyclobenzaprine 5 milliGRAM(s) Oral at bedtime  dextrose 40% Gel 15 Gram(s) Oral once  dextrose 5%. 1000 milliLiter(s) (50 mL/Hr) IV Continuous <Continuous>  dextrose 5%. 1000 milliLiter(s) (100 mL/Hr) IV Continuous <Continuous>  dextrose 50% Injectable 25 Gram(s) IV Push once  dextrose 50% Injectable 12.5 Gram(s) IV Push once  dextrose 50% Injectable 25 Gram(s) IV Push once  enoxaparin Injectable 40 milliGRAM(s) SubCutaneous daily  glucagon  Injectable 1 milliGRAM(s) IntraMuscular once  insulin glargine Injectable (LANTUS) 46 Unit(s) SubCutaneous at bedtime  insulin lispro (ADMELOG) corrective regimen sliding scale   SubCutaneous three times a day before meals  insulin lispro (ADMELOG) corrective regimen sliding scale   SubCutaneous at bedtime  insulin lispro Injectable (ADMELOG) 3 Unit(s) SubCutaneous three times a day before meals  mirtazapine 7.5 milliGRAM(s) Oral at bedtime  nicotine -  14 mG/24Hr(s) Patch 1 patch Transdermal at bedtime  potassium chloride    Tablet ER 40 milliEquivalent(s) Oral every 4 hours  rifAMPin 600 milliGRAM(s) Oral daily  sertraline 100 milliGRAM(s) Oral daily  tamsulosin 0.8 milliGRAM(s) Oral at bedtime    MEDICATIONS  (PRN):  clonazePAM  Tablet 0.5 milliGRAM(s) Oral every 12 hours PRN anxiety  zolpidem 5 milliGRAM(s) Oral at bedtime PRN Insomnia  zolpidem 5 milliGRAM(s) Oral at bedtime PRN Insomnia      Allergies  No Known Allergies      REVIEW OF SYSTEMS:  CONSTITUTIONAL: No fever, weight loss, or fatigue  EYES: No eye pain, visual disturbances, or discharge  ENMT:  No difficulty hearing, tinnitus, vertigo; No sinus or throat pain  NECK: No pain or stiffness  BREASTS: No pain, masses, or nipple discharge  RESPIRATORY: No cough, wheezing, chills or hemoptysis; No shortness of breath  CARDIOVASCULAR: No chest pain, palpitations, lightheadedness, or leg swelling  GASTROINTESTINAL: No abdominal or epigastric pain. No nausea, vomiting, or hematemesis; No diarrhea or constipation. No melena or hematochezia.  GENITOURINARY: mcrae  NEUROLOGICAL: No headaches, memory loss, vertigo, loss of strength, numbness, or tremors  SKIN: No itching, burning, rashes, or lesions   LYMPH NODES: No enlarged glands  ENDOCRINE: No heat or cold intolerance; No hair loss; No polydipsia or polyuria  MUSCULOSKELETAL: No joint pain or swelling; No muscle, back, or extremity pain  PSYCHIATRIC: No depression, anxiety, or mood swings  HEME/LYMPH: No easy bruising, or bleeding gums  ALLERGY AND IMMUNOLOGIC: No hives or eczema    Vital Signs Last 24 Hrs  T(C): 37 (24 Aug 2021 10:45), Max: 37 (24 Aug 2021 10:45)  T(F): 98.6 (24 Aug 2021 10:45), Max: 98.6 (24 Aug 2021 10:45)  HR: 76 (24 Aug 2021 10:45) (76 - 94)  BP: 152/76 (24 Aug 2021 10:45) (126/58 - 152/76)  BP(mean): --  RR: 18 (24 Aug 2021 10:45) (14 - 18)  SpO2: 98% (24 Aug 2021 10:45) (94% - 98%)    PHYSICAL EXAM:  GENERAL: NAD, well-groomed, well-developed  HEAD:  Atraumatic, Normocephalic  EYES:  conjunctiva and sclera clear  ENMT: Moist mucous membranes  NECK: Supple, No JVD  NERVOUS SYSTEM:  Alert & Oriented X3, Good concentration; All 4 extremities mobile, no gross sensory deficits.   CHEST/LUNG: Clear to auscultation bilaterally; No rales, rhonchi, wheezing, or rubs  HEART: Regular rate and rhythm; No murmurs, rubs, or gallops  ABDOMEN: Soft, Nontender, Nondistended; Bowel sounds present  EXTREMITIES:  2+ Peripheral Pulses, No clubbing, cyanosis, or edema    LABS:                        11.4   4.56  )-----------( 147      ( 24 Aug 2021 07:18 )             34.3     24 Aug 2021 07:18    141    |  104    |  11     ----------------------------<  74     3.1     |  33     |  0.67     Ca    8.2        24 Aug 2021 07:18    TPro  6.7    /  Alb  2.4    /  TBili  0.5    /  DBili  x      /  AST  67     /  ALT  71     /  AlkPhos  165    24 Aug 2021 07:18    PT/INR - ( 23 Aug 2021 05:55 )   PT: 16.5 sec;   INR: 1.38 ratio    PTT - ( 22 Aug 2021 17:24 )  PTT:31.6 sec    Urinalysis Basic - ( 22 Aug 2021 17:24 )  Color: Yellow / Appearance: Clear / S.005 / pH: x  Gluc: x / Ketone: Negative  / Bili: Small / Urobili: 4 mg/dL   Blood: x / Protein: 30 mg/dL / Nitrite: Negative   Leuk Esterase: Small / RBC: 6-10 /HPF / WBC 3-5   Sq Epi: x / Non Sq Epi: Few / Bacteria: Few      CAPILLARY BLOOD GLUCOSE  POCT Blood Glucose.: 153 mg/dL (24 Aug 2021 12:14)  POCT Blood Glucose.: 78 mg/dL (24 Aug 2021 07:48)  POCT Blood Glucose.: 231 mg/dL (23 Aug 2021 22:24)  POCT Blood Glucose.: 281 mg/dL (23 Aug 2021 17:19)      RADIOLOGY & ADDITIONAL TESTS:    Imaging Personally Reviewed:  [ ] YES     Consultant(s) Notes Reviewed:      Care Discussed with Consultants/Other Providers: Dr Gutierrez and Dr holloway - see below    Advanced Directives: [ ] DNR  [ ] No feeding tube  [ ] MOLST in chart  [ ] MOLST completed today  [ ] Unknown

## 2021-08-24 NOTE — PROGRESS NOTE ADULT - PROBLEM SELECTOR PLAN 1
Continue Rocephin for now; possibly switch to PO abx tomorrow  Dr Fox consult appreciated  f/u repeat cultures
Continue Keily for now  Dr Fox to see patient later today.  will f/u.  DC IVF, encourage oral fluids

## 2021-08-24 NOTE — PROGRESS NOTE ADULT - PROBLEM SELECTOR PLAN 5
controlled, already received his weekly Dulaglutide, Empagliflozin is non formulary, hold Metformin as he got IV contrast at the CT prior to admission,  Low morning glucose then higher during the day.  will lower lantus and add premeal lispro.   A1C = 6.8  cont FS monitoring with Lispro coverage

## 2021-08-24 NOTE — PROGRESS NOTE ADULT - SUBJECTIVE AND OBJECTIVE BOX
KAMILA FUENTES is a 65yMale , patient examined and chart reviewed.     INTERVAL HPI/ OVERNIGHT EVENTS:   Feels well. Afebrile.    PAST MEDICAL & SURGICAL HISTORY:  DM (diabetes mellitus)  Hyperlipemia  BPH (benign prostatic hyperplasia)  MS (multiple sclerosis)  Anxiety  Tuberculosis      For details regarding the patient's social history, family history, and other miscellaneous elements, please refer the initial infectious diseases consultation and/or the admitting history and physical examination for this admission.    ROS:  CONSTITUTIONAL:  Negative fever or chills  EYES:  Negative  blurry vision or double vision  CARDIOVASCULAR:  Negative for chest pain or palpitations  RESPIRATORY:  Negative for cough, wheezing, or SOB   GASTROINTESTINAL:  Negative for nausea, vomiting, diarrhea, constipation, or abdominal pain  GENITOURINARY:  Negative frequency, urgency or dysuria  NEUROLOGIC:  No headache, confusion, dizziness, lightheadedness  All other systems were reviewed and are negative       Current inpatient medications :    ANTIBIOTICS/RELEVANT:  cefTRIAXone   IVPB 1000 milliGRAM(s) IV Intermittent every 24 hours  nicotine -  14 mG/24Hr(s) Patch 1 patch Transdermal at bedtime  rifAMPin 600 milliGRAM(s) Oral daily      aspirin enteric coated 81 milliGRAM(s) Oral daily  calcium carbonate 1250 mG  + Vitamin D (OsCal 500 + D) 1 Tablet(s) Oral two times a day  clonazePAM  Tablet 1 milliGRAM(s) Oral at bedtime  clonazePAM  Tablet 0.5 milliGRAM(s) Oral every 12 hours PRN  cyclobenzaprine 5 milliGRAM(s) Oral at bedtime  dextrose 40% Gel 15 Gram(s) Oral once  dextrose 5%. 1000 milliLiter(s) IV Continuous <Continuous>  dextrose 5%. 1000 milliLiter(s) IV Continuous <Continuous>  dextrose 50% Injectable 25 Gram(s) IV Push once  dextrose 50% Injectable 12.5 Gram(s) IV Push once  dextrose 50% Injectable 25 Gram(s) IV Push once  enoxaparin Injectable 40 milliGRAM(s) SubCutaneous daily  glucagon  Injectable 1 milliGRAM(s) IntraMuscular once  insulin glargine Injectable (LANTUS) 46 Unit(s) SubCutaneous at bedtime  insulin lispro (ADMELOG) corrective regimen sliding scale   SubCutaneous three times a day before meals  insulin lispro (ADMELOG) corrective regimen sliding scale   SubCutaneous at bedtime  insulin lispro Injectable (ADMELOG) 3 Unit(s) SubCutaneous three times a day before meals  mirtazapine 7.5 milliGRAM(s) Oral at bedtime  sertraline 100 milliGRAM(s) Oral daily  tamsulosin 0.8 milliGRAM(s) Oral at bedtime  zolpidem 5 milliGRAM(s) Oral at bedtime PRN  zolpidem 5 milliGRAM(s) Oral at bedtime PRN      Objective:     @ 07:01  -   @ 07:00  --------------------------------------------------------  IN: 750 mL / OUT: 1800 mL / NET: -1050 mL     @ 07:  -   @ 16:27  --------------------------------------------------------  IN: 0 mL / OUT: 450 mL / NET: -450 mL      T(C): 36.9 (21 @ 14:17), Max: 37 (21 @ 10:45)  HR: 79 (21 @ 14:17) (76 - 94)  BP: 126/59 (21 @ 14:17) (126/59 - 152/76)  RR: 18 (21 @ 14:17) (14 - 18)  SpO2: 96% (21 @ 14:17) (94% - 98%)      Physical Exam:  General: no acute distress  Neck: supple, trachea midline  Lungs: clear, no wheeze/rhonchi  Cardiovascular: regular rate and rhythm, S1 S2  Abdomen: soft, nontender,  bowel sounds normal  Neurological: alert and oriented x3  Skin: no rash  Extremities: no edema        LABS:                        11.4   4.56  )-----------( 147      ( 24 Aug 2021 07:18 )             34.3           141  |  104  |  11  ----------------------------<  74  3.1<L>   |  33<H>  |  0.67    Ca    8.2<L>      24 Aug 2021 07:18    TPro  6.7  /  Alb  2.4<L>  /  TBili  0.5  /  DBili  x   /  AST  67<H>  /  ALT  71<H>  /  AlkPhos  165<H>  08-24      PT/INR - ( 23 Aug 2021 05:55 )   PT: 16.5 sec;   INR: 1.38 ratio         PTT - ( 22 Aug 2021 17:24 )  PTT:31.6 sec  Urinalysis Basic - ( 22 Aug 2021 17:24 )    Color: Yellow / Appearance: Clear / S.005 / pH: x  Gluc: x / Ketone: Negative  / Bili: Small / Urobili: 4 mg/dL   Blood: x / Protein: 30 mg/dL / Nitrite: Negative   Leuk Esterase: Small / RBC: 6-10 /HPF / WBC 3-5   Sq Epi: x / Non Sq Epi: Few / Bacteria: Few      MICROBIOLOGY:    Culture - Urine (collected 22 Aug 2021 20:44)  Source: Clean Catch Clean Catch (Midstream)  Final Report (23 Aug 2021 16:08):    No growth    Culture - Blood (collected 22 Aug 2021 19:56)  Source: .Blood Blood-Peripheral  Preliminary Report (23 Aug 2021 20:02):    No growth to date.    Culture - Blood (collected 22 Aug 2021 19:56)  Source: .Blood Blood-Peripheral  Preliminary Report (23 Aug 2021 20:02):    No growth to date.      Culture - Urine (collected 20 Aug 2021 23:21)  Source: Clean Catch Clean Catch (Midstream)  Final Report (23 Aug 2021 17:14):    50,000 - 99,000 CFU/mL Escherichia coli  Organism: Escherichia coli (23 Aug 2021 17:14)  Organism: Escherichia coli (23 Aug 2021 17:14)      -  Amikacin: S <=16      -  Amoxicillin/Clavulanic Acid: S <=8/4      -  Ampicillin: S <=8 These ampicillin results predict results for amoxicillin      -  Ampicillin/Sulbactam: S <=4/2 Enterobacter, Citrobacter, and Serratia may develop resistance during prolonged therapy (3-4 days)      -  Aztreonam: S <=4      -  Cefazolin: S <=2 (MIC_CL_[id number]) For uncomplicated UTI with K. pneumoniae, E. coli, or P. mirablis: LENKA <=16 is sensitive and LENKA >=32 is resistant. This also predicts results for oral agents cefaclor, cefdinir, cefpodoxime, cefprozil, cefuroxime axetil, cephalexin and locarbef for uncomplicated UTI. Note that some isolates may be susceptible to these agents while testing resistant to cefazolin.      -  Cefepime: S <=2      -  Cefoxitin: S <=8      -  Ceftriaxone: S <=1 Enterobacter, Citrobacter, and Serratia may develop resistance during prolonged therapy      -  Ciprofloxacin: R >2      -  Ertapenem: S <=0.5      -  Gentamicin: S <=2      -  Imipenem: S <=1      -  Levofloxacin: R >4      -  Meropenem: S <=1      -  Nitrofurantoin: S <=32 Should not be used to treat pyelonephritis      -  Piperacillin/Tazobactam: S <=8      -  Tigecycline: S <=2      -  Tobramycin: S <=2      -  Trimethoprim/Sulfamethoxazole: R >2/38      Method Type: LENKA    Culture - Blood (collected 20 Aug 2021 23:18)  Source: .Blood Blood-Peripheral  Gram Stain (21 Aug 2021 21:21):    Growth in anaerobic bottle: Gram Negative Rods    Growth in aerobic bottle: Gram Negative Rods  Final Report (23 Aug 2021 07:57):    Growth in aerobic and anaerobic bottles: Escherichia coli    ***Blood Panel PCR results on this specimen are available    approximately 3 hours after the Gram stain result.***    Gram stain, PCR, and/or culture results may not always    correspond due to difference in methodologies.    ************************************************************    This PCR assay was performed by multiplex PCR. This    Assay tests for 66 bacterial and resistance gene targets.    Please refer to the [facility] test directory    at [email or web address] for details.  Organism: Blood Culture PCR  Escherichia coli (23 Aug 2021 07:57)  Organism: Escherichia coli (23 Aug 2021 07:57)      -  Amikacin: S <=16      -  Ampicillin: S <=8 These ampicillin results predict results for amoxicillin      -  Ampicillin/Sulbactam: S <=4/2 Enterobacter, Citrobacter, and Serratia may develop resistance during prolonged therapy (3-4 days)      -  Aztreonam: S <=4      -  Cefazolin: S <=2 Enterobacter, Citrobacter, and Serratia may develop resistance during prolonged therapy (3-4 days)      -  Cefepime: S <=2      -  Cefoxitin: S <=8      -  Ceftriaxone: S <=1 Enterobacter, Citrobacter, and Serratia may develop resistance during prolonged therapy      -  Ciprofloxacin: R >2      -  Ertapenem: S <=0.5      -  Gentamicin: S <=2      -  Imipenem: S <=1      -  Levofloxacin: R >4      -  Meropenem: S <=1      -  Piperacillin/Tazobactam: S <=8      -  Tobramycin: S <=2      -  Trimethoprim/Sulfamethoxazole: R >2/38      Method Type: LENKA  Organism: Blood Culture PCR (23 Aug 2021 07:57)      -  Escherichia coli: Detec      Method Type: PCR    Culture - Blood (collected 20 Aug 2021 23:18)  Source: .Blood Blood-Peripheral  Preliminary Report (22 Aug 2021 01:01):    No growth to date.      RADIOLOGY & ADDITIONAL STUDIES:  EXAM:  CT ABDOMEN AND PELVIS IC                          EXAM:  CT CHEST IC                                  PROCEDURE DATE:  2021          INTERPRETATION:  CLINICAL INFORMATION: Elevated LFTs, bacteremia. Evaluate for source. It is    COMPARISON: None.    CONTRAST/COMPLICATIONS:  IV Contrast: Omnipaque 300 (accession [id number]), IV contrast documented in associated exam (accession [id number])  90 cc administered   10 cc discarded  Oral Contrast: NONE  Complications: None reported at time ofstudy completion    PROCEDURE:  CT of the Chest, Abdomen and Pelvis was performed.  Sagittal and coronal reformats were performed.    FINDINGS:  CHEST:  LUNGS AND LARGE AIRWAYS: Trace linear secretions in the trachea. Otherwise central airways are patent. Mild centrilobular emphysema. Bibasilar dependent atelectasis. 4 mm right lower lobe nodule (4, 98).  PLEURA: No pleural effusion.  VESSELS: Atherosclerotic changes of the aorta.  HEART: Heart size is normal. No pericardial effusion.  MEDIASTINUM AND HALINA: No lymphadenopathy. Nonspecific 9 mm  esophageal node (4, 122).  CHEST WALL AND LOWER NECK: Within normal limits.    ABDOMEN AND PELVIS:  LIVER: Nonspecific focus of 9 mm enhancement in the posterior right hepatic lobe (4, 138).  BILE DUCTS: Normal caliber.  GALLBLADDER: Within normal limits.  SPLEEN: Splenomegaly.  PANCREAS: Within normal limits.  ADRENALS: Within normal limits.  KIDNEYS/URETERS: Mild bilateral perinephric stranding. Right renal cyst and bilateral subcentimeter hypodensities too small to characterize. Heterogeneous enhancement in the left upper pole (example 7, 78), the right upper pole (7, 79) and the medial right interpolar kidney (7, 65). No hydronephrosis or obstructing stone. Mild bilateral ureteral enhancement.    BLADDER: Decompressed by catheter. Diffuse wall thickening, though underdistended.  REPRODUCTIVE ORGANS: Enlarged prostate.    BOWEL: No bowel obstruction. Appendix is normal. Circumferential wall thickening of the proximal duodenum, possibly related to underdistention. No surrounding inflammatory changes.  PERITONEUM: No ascites.  VESSELS: Atherosclerotic changes.  RETROPERITONEUM/LYMPH NODES: No lymphadenopathy.  ABDOMINAL WALL: Within normal limits.  BONES: Chronic left ninth rib fracture deformity. Degenerative changes.    IMPRESSION:    1. Findings suspicious for bilateral pyelonephritis, ureteritis and possibly cystitis. Correlation with urinalysis is recommended.  2. Circumferential wall thickening of the proximal duodenum, whichcould be related to underdistention. No associated inflammatory changes. Clinical correlation is recommended.  3. 9 mm focus of hyperenhancement in the posterior right hepatic lobe is indeterminate, possibly flash filling hemangioma. Follow-up MRI isrecommended for further evaluation.  4. Splenomegaly.  5. Mild emphysema. 4 mm nodule in the right lower lobe. Optional follow-up chest CT in 12 months can be obtained to assess for stability.      Assessment :   This is a 66 y/o M with PMH of HTN, DM type 2, Dyslipidemia, MS with right sided lower extremity weakness, Latent TB on therapy, recently diagnosed BPH with recent urinary retention with indwelling urinary catheter approximately 3 weeks ago admitted with Ecoli bacteremia sec CAUTI  Repeat blood cultures NGTD      Plan :   Cont Rocephin  Can change to po Ceftin x 7 days on dc tmrw  Trend temps and cbc  Stable from ID standpoint  Coker per urology    D/w Dr Peterson    Continue with present regiment.  Appropriate use of antibiotics and adverse effects reviewed.      I have discussed the above plan of care with patient in detail. He expressed understanding of the  treatment plan . Risks, benefits and alternatives discussed in detail. I have asked if he has any questions or concerns and appropriately addressed them to the best of my ability .    > 35 minutes were spent in direct patient care reviewing notes, medications ,labs data/ imaging , discussion with multidisciplinary team.    Thank you for allowing me to participate in care of your patient .    George Fox MD  Infectious Disease  [phone]

## 2021-08-25 ENCOUNTER — TRANSCRIPTION ENCOUNTER (OUTPATIENT)
Age: 66
End: 2021-08-25

## 2021-08-25 VITALS
OXYGEN SATURATION: 100 % | TEMPERATURE: 98 F | DIASTOLIC BLOOD PRESSURE: 74 MMHG | HEART RATE: 86 BPM | SYSTOLIC BLOOD PRESSURE: 149 MMHG | RESPIRATION RATE: 17 BRPM

## 2021-08-25 LAB — GRAM STN FLD: SIGNIFICANT CHANGE UP

## 2021-08-25 PROCEDURE — 87086 URINE CULTURE/COLONY COUNT: CPT

## 2021-08-25 PROCEDURE — 87040 BLOOD CULTURE FOR BACTERIA: CPT

## 2021-08-25 PROCEDURE — 87150 DNA/RNA AMPLIFIED PROBE: CPT

## 2021-08-25 PROCEDURE — 99239 HOSP IP/OBS DSCHRG MGMT >30: CPT

## 2021-08-25 PROCEDURE — 80053 COMPREHEN METABOLIC PANEL: CPT

## 2021-08-25 PROCEDURE — 87186 SC STD MICRODIL/AGAR DIL: CPT

## 2021-08-25 PROCEDURE — 86769 SARS-COV-2 COVID-19 ANTIBODY: CPT

## 2021-08-25 PROCEDURE — 85610 PROTHROMBIN TIME: CPT

## 2021-08-25 PROCEDURE — 96361 HYDRATE IV INFUSION ADD-ON: CPT

## 2021-08-25 PROCEDURE — 99285 EMERGENCY DEPT VISIT HI MDM: CPT | Mod: 25

## 2021-08-25 PROCEDURE — 71045 X-RAY EXAM CHEST 1 VIEW: CPT

## 2021-08-25 PROCEDURE — 83605 ASSAY OF LACTIC ACID: CPT

## 2021-08-25 PROCEDURE — 99284 EMERGENCY DEPT VISIT MOD MDM: CPT | Mod: 25

## 2021-08-25 PROCEDURE — 93005 ELECTROCARDIOGRAM TRACING: CPT

## 2021-08-25 PROCEDURE — 82962 GLUCOSE BLOOD TEST: CPT

## 2021-08-25 PROCEDURE — 71046 X-RAY EXAM CHEST 2 VIEWS: CPT

## 2021-08-25 PROCEDURE — 71260 CT THORAX DX C+: CPT

## 2021-08-25 PROCEDURE — 96374 THER/PROPH/DIAG INJ IV PUSH: CPT

## 2021-08-25 PROCEDURE — 86803 HEPATITIS C AB TEST: CPT

## 2021-08-25 PROCEDURE — 81001 URINALYSIS AUTO W/SCOPE: CPT

## 2021-08-25 PROCEDURE — 87635 SARS-COV-2 COVID-19 AMP PRB: CPT

## 2021-08-25 PROCEDURE — 96365 THER/PROPH/DIAG IV INF INIT: CPT

## 2021-08-25 PROCEDURE — 85025 COMPLETE CBC W/AUTO DIFF WBC: CPT

## 2021-08-25 PROCEDURE — 83036 HEMOGLOBIN GLYCOSYLATED A1C: CPT

## 2021-08-25 PROCEDURE — 85730 THROMBOPLASTIN TIME PARTIAL: CPT

## 2021-08-25 PROCEDURE — 87077 CULTURE AEROBIC IDENTIFY: CPT

## 2021-08-25 PROCEDURE — 36415 COLL VENOUS BLD VENIPUNCTURE: CPT

## 2021-08-25 PROCEDURE — 74177 CT ABD & PELVIS W/CONTRAST: CPT

## 2021-08-25 RX ORDER — CEFUROXIME AXETIL 250 MG
1 TABLET ORAL
Qty: 20 | Refills: 0
Start: 2021-08-25 | End: 2021-09-03

## 2021-08-25 RX ADMIN — Medication 0.5 MILLIGRAM(S): at 12:48

## 2021-08-25 RX ADMIN — Medication 3 UNIT(S): at 12:36

## 2021-08-25 RX ADMIN — Medication 3 UNIT(S): at 17:15

## 2021-08-25 RX ADMIN — Medication 3 UNIT(S): at 08:10

## 2021-08-25 RX ADMIN — SERTRALINE 100 MILLIGRAM(S): 25 TABLET, FILM COATED ORAL at 14:53

## 2021-08-25 RX ADMIN — Medication 1 TABLET(S): at 05:49

## 2021-08-25 RX ADMIN — Medication 2: at 12:36

## 2021-08-25 RX ADMIN — Medication 81 MILLIGRAM(S): at 12:37

## 2021-08-25 RX ADMIN — CEFTRIAXONE 100 MILLIGRAM(S): 500 INJECTION, POWDER, FOR SOLUTION INTRAMUSCULAR; INTRAVENOUS at 14:52

## 2021-08-25 RX ADMIN — Medication 1 TABLET(S): at 17:16

## 2021-08-25 RX ADMIN — ENOXAPARIN SODIUM 40 MILLIGRAM(S): 100 INJECTION SUBCUTANEOUS at 12:37

## 2021-08-25 RX ADMIN — Medication 1 PATCH: at 12:36

## 2021-08-25 NOTE — DISCHARGE NOTE PROVIDER - NSDCMRMEDTOKEN_GEN_ALL_CORE_FT
Aspir 81 oral delayed release tablet: 1 tab(s) orally once a day  Basaglar KwikPen 100 units/mL subcutaneous solution: 64 unit(s) subcutaneous once a day  Calcium 600+D 600 mg-5 mcg (200 intl units) oral tablet: 1 tab(s) orally 2 times a day  cefuroxime 500 mg oral tablet: 1 tab(s) orally every 12 hours   clonazePAM 1 mg oral tablet: 1 tab(s) orally once a day (at bedtime)  cyclobenzaprine 5 mg oral tablet: 1 tab(s) orally once a day (at bedtime)  mirtazapine 7.5 mg oral tablet: 1  orally once a day (at bedtime)  15 mg tablet     rifAMPin 300 mg oral capsule: 2 cap(s) orally once a day  sertraline 100 mg oral tablet: 1 tab(s) orally once a day (at bedtime)  Synjardy XR 5 mg-1000 mg oral tablet, extended release: 2 tab(s) orally once a day  tamsulosin 0.4 mg oral capsule: 2 cap(s) orally once a day (at bedtime)  Trulicity Pen 1.5 mg/0.5 mL subcutaneous solution: subcutaneous every 7 days  zolpidem 10 mg oral tablet: 1 tab(s) orally once a day (at bedtime)

## 2021-08-25 NOTE — DISCHARGE NOTE NURSING/CASE MANAGEMENT/SOCIAL WORK - NSDCPEFALRISK_GEN_ALL_CORE
For information on Fall & injury Prevention, visit [email or web address]

## 2021-08-25 NOTE — PROGRESS NOTE ADULT - REASON FOR ADMISSION
Was called back for a positive blood culture.

## 2021-08-25 NOTE — DISCHARGE NOTE NURSING/CASE MANAGEMENT/SOCIAL WORK - PATIENT PORTAL LINK FT
You can access the FollowMyHealth Patient Portal offered by [facility] by registering at the following website: [email or web address]. By joining [facility]’s FollowMyHealth portal, you will also be able to view your health information using other applications (apps) compatible with our system.

## 2021-08-25 NOTE — DISCHARGE NOTE PROVIDER - CARE PROVIDER_API CALL
Delfin Samuel)  Surgery  [address] [place] [address]  Phone: [phone]  Fax: [phone]  Follow Up Time:     Khadar Gutierrez  UROLOGY  [address] [place] [address]  Phone: [phone]  Fax: [phone]  Follow Up Time: 1 week    Dionisio Banks  INFECTIOUS DISEASE  [address] [place] [address]  Phone: [phone]  Fax: [phone]  Follow Up Time:

## 2021-08-25 NOTE — DISCHARGE NOTE PROVIDER - HOSPITAL COURSE
66 y/o M with PMH of HTN, DM type 2, Dyslipidemia, MS with right sided lower extremity weakness, Latent TB on therapy, recently diagnosed BPH with urinary retention with indwelling urinary catheter, Insomnia, and Anxiety who was called to the ED for a positive blood culture. Patient was seen at [place] ED 24 hours prior to admission with fever & chills, his Coker cath was changed and he was sent home on bactrim for UTI after urine culture & blood culture X2 were obtained by ED team.   His preliminary culture result was positive for E-Coli so he was called to come back to the ED. Patient reports intermittent fever & chills with nausea, last temp was 100.0 in the am, had a single vomiting episode 2 days ago after he left the ED, unremarkable vomitus, no vomiting since then. Patient was diagnosed with MS in February, and was found to have latent TB on routine w/u prior to starting his MS treatment, and was started on TB meds.    Patient admitted to medical floor.  Original blood cultures 2/4 bottles and urine cultures grew ecoli resistant to bactrim.  Repeat cultures done on this ED admission also showed 2/4 bottles GNR - likely same organism.   CT C/A/P -   1. Findings suspicious for bilateral pyelonephritis, ureteritis and possibly cystitis. Correlation with urinalysis is recommended.  2. Circumferential wall thickening of the proximal duodenum, which could be related to underdistention. No associated inflammatory changes. Clinical correlation is recommended.  3. 9 mm focus of hyperenhancement in the posterior right hepatic lobe is indeterminate, possibly flash filling hemangioma. Follow-up MRI is recommended for further evaluation.  4. Splenomegaly.  5. Mild emphysema. 4 mm nodule in the right lower lobe. Optional follow-up chest CT in 12 months can be obtained to assess for stability.    Patient treated with IV Rocephin for pyelonephritis.  His symptoms improved.  Seen by Dr Fox from ID.  Cleared to transfer to oral antibiotics.    As per discussion with Dr Gutierrez, patient's own urologist Coker was removed.  Bladder scan and voiding monitored and patient is able to void on his own.    PHYSICAL EXAM:  Vital Signs Last 24 Hrs  T(C): 36.7 (25 Aug 2021 10:51), Max: 36.9 (24 Aug 2021 14:17)  T(F): 98.1 (25 Aug 2021 10:51), Max: 98.4 (24 Aug 2021 14:17)  HR: 73 (25 Aug 2021 10:51) (73 - 79)  BP: 139/68 (25 Aug 2021 10:51) (126/59 - 144/74)  BP(mean): --  RR: 18 (25 Aug 2021 10:51) (16 - 18)  SpO2: 99% (25 Aug 2021 10:51) (93% - 99%)    GENERAL: NAD, well-groomed, well-developed  HEAD:  Atraumatic, Normocephalic  EYES: conjunctiva and sclera clear  ENMT: Moist mucous membranes  NECK: Supple, No JVD  NERVOUS SYSTEM:  Alert & Oriented X3, Good concentration; Motor Strength 5/5 B/L upper and lower extremities;  CHEST/LUNG: Clear to auscultation bilaterally; No rales, rhonchi, wheezing, or rubs  HEART: Regular rate and rhythm; No murmurs, rubs, or gallops  ABDOMEN: Soft, Nontender, Nondistended; Bowel sounds present  EXTREMITIES:  2+ Peripheral Pulses, No clubbing, cyanosis, or edema    Patient to start a week of oral Ceftin tomorrow at home.   To follow up with Dr Gutierrez regarding BPH and voiding issues.    Discussed with wife over the phone.    DC time with patient and coordinating care about 45 minutes.    64 y/o M with PMH of HTN, DM type 2, Dyslipidemia, MS with right sided lower extremity weakness, Latent TB on therapy, recently diagnosed BPH with urinary retention with indwelling urinary catheter, Insomnia, and Anxiety who was called to the ED for a positive blood culture. Patient was seen at [place] ED 24 hours prior to admission with fever & chills, his Coker cath was changed and he was sent home on bactrim for UTI after urine culture & blood culture X2 were obtained by ED team.   His preliminary culture result was positive for E-Coli so he was called to come back to the ED. Patient reports intermittent fever & chills with nausea, last temp was 100.0 in the am, had a single vomiting episode 2 days ago after he left the ED, unremarkable vomitus, no vomiting since then. Patient was diagnosed with MS in February, and was found to have latent TB on routine w/u prior to starting his MS treatment, and was started on TB meds.    Patient admitted to medical floor.  Original blood cultures 2/4 bottles and urine cultures grew ecoli resistant to bactrim.  Repeat cultures done on this ED admission also showed 2/4 bottles GNR - likely same organism.   CT C/A/P -   1. Findings suspicious for bilateral pyelonephritis, ureteritis and possibly cystitis. Correlation with urinalysis is recommended.  2. Circumferential wall thickening of the proximal duodenum, which could be related to underdistention. No associated inflammatory changes. Clinical correlation is recommended.  3. 9 mm focus of hyperenhancement in the posterior right hepatic lobe is indeterminate, possibly flash filling hemangioma. Follow-up MRI is recommended for further evaluation.  4. Splenomegaly.  5. Mild emphysema. 4 mm nodule in the right lower lobe. Optional follow-up chest CT in 12 months can be obtained to assess for stability.    Patient treated with IV Rocephin for pyelonephritis.  His symptoms improved.  Seen by Dr Fox from ID.  Cleared to transfer to oral antibiotics.    As per discussion with Dr Gutierrez, patient's own urologist Coker was removed.  Bladder scan and voiding monitored and patient is able to void on his own.    PHYSICAL EXAM:  Vital Signs Last 24 Hrs  T(C): 36.7 (25 Aug 2021 10:51), Max: 36.9 (24 Aug 2021 14:17)  T(F): 98.1 (25 Aug 2021 10:51), Max: 98.4 (24 Aug 2021 14:17)  HR: 73 (25 Aug 2021 10:51) (73 - 79)  BP: 139/68 (25 Aug 2021 10:51) (126/59 - 144/74)  BP(mean): --  RR: 18 (25 Aug 2021 10:51) (16 - 18)  SpO2: 99% (25 Aug 2021 10:51) (93% - 99%)    GENERAL: NAD, well-groomed, well-developed  HEAD:  Atraumatic, Normocephalic  EYES: conjunctiva and sclera clear  ENMT: Moist mucous membranes  NECK: Supple, No JVD  NERVOUS SYSTEM:  Alert & Oriented X3, Good concentration; Motor Strength 5/5 B/L upper and lower extremities;  CHEST/LUNG: Clear to auscultation bilaterally; No rales, rhonchi, wheezing, or rubs  HEART: Regular rate and rhythm; No murmurs, rubs, or gallops  ABDOMEN: Soft, Nontender, Nondistended; Bowel sounds present  EXTREMITIES:  2+ Peripheral Pulses, No clubbing, cyanosis, or edema    Patient to start a week of oral Ceftin tomorrow at home.   To follow up with Dr Gutierrez regarding urine retention.     Discussed with wife over the phone.    DC time with patient and coordinating care about 45 minutes.

## 2021-08-25 NOTE — PROGRESS NOTE ADULT - PROVIDER SPECIALTY LIST ADULT
Infectious Disease
Infectious Disease
[FreeTextEntry1] : 66 year old male who came in for on going evaluation for HTN, MVP, hyperlipidemia and palpitations. Patient denies chest pains, cough, fever or dyspnea. The patient is currently being treated for Lupus.\par \par \par PMH is significant for:\par \par HTN - controlled\par Palpitations - stable\par MVP - stable\par Hyperlipidemia - controlled\par Lupus - stable\par \par Medications reviewed and reconciled with patient. Patient is compliant with taking appropriate medications at appropriate doses at appropriate intervals without missing doses. 
Hospitalist
Hospitalist

## 2021-08-25 NOTE — DISCHARGE NOTE PROVIDER - NSDCCPCAREPLAN_GEN_ALL_CORE_FT
PRINCIPAL DISCHARGE DIAGNOSIS  Diagnosis: E coli bacteremia  Assessment and Plan of Treatment: Do not use the antoibiotics you have at home.  Start the new antibiotic tomorrow morning and take for 7 days.  (This does not affect the Rifampin you take for TB - continue that daily per Dr Banks's instructions)      SECONDARY DISCHARGE DIAGNOSES  Diagnosis: BPH with urinary obstruction  Assessment and Plan of Treatment: Continue to monitor voiding without the Coker.  If you have any issues call Dr Gutierrez or return to the ED.  follow up with Dr Gutierrez.    Diagnosis: DM2 (diabetes mellitus, type 2)  Assessment and Plan of Treatment: resume home medications.    Diagnosis: Liver hemangioma  Assessment and Plan of Treatment: A small 9mm lesion was seen on your liver.  This is likley to be a benign hemangioma.  Please discuss with your primary care doctor whether an MRI is needed.    Diagnosis: Lung nodule  Assessment and Plan of Treatment: You have a small 4mm lung nodule.  Please discuss with your doctor if repeat CT scan in 6-12 months is necessary to monitor the nodule.    Diagnosis: Pyelonephritis  Assessment and Plan of Treatment: complete antibiotics as above.     PRINCIPAL DISCHARGE DIAGNOSIS  Diagnosis: E coli bacteremia  Assessment and Plan of Treatment: Do not use the antoibiotics you have at home.  Start the new antibiotic tomorrow morning and take for 7 days.  (This does not affect the Rifampin you take for TB - continue that daily per Dr Banks's instructions)      SECONDARY DISCHARGE DIAGNOSES  Diagnosis: BPH with urinary obstruction  Assessment and Plan of Treatment: Follow up with Dr Gutierrez.    Diagnosis: DM2 (diabetes mellitus, type 2)  Assessment and Plan of Treatment: resume home medications.    Diagnosis: Liver hemangioma  Assessment and Plan of Treatment: A small 9mm lesion was seen on your liver.  This is likley to be a benign hemangioma.  Please discuss with your primary care doctor whether an MRI is needed.    Diagnosis: Lung nodule  Assessment and Plan of Treatment: You have a small 4mm lung nodule.  Please discuss with your doctor if repeat CT scan in 6-12 months is necessary to monitor the nodule.    Diagnosis: Pyelonephritis  Assessment and Plan of Treatment: complete antibiotics as above.

## 2021-08-25 NOTE — PROGRESS NOTE ADULT - SUBJECTIVE AND OBJECTIVE BOX
KAMILA FUENTES is a 65yMale , patient examined and chart reviewed.     INTERVAL HPI/ OVERNIGHT EVENTS:   Feels well. Afebrile.  Coker dc - voiding well.    PAST MEDICAL & SURGICAL HISTORY:  DM (diabetes mellitus)  Hyperlipemia  BPH (benign prostatic hyperplasia)  MS (multiple sclerosis)  Anxiety  Tuberculosis      For details regarding the patient's social history, family history, and other miscellaneous elements, please refer the initial infectious diseases consultation and/or the admitting history and physical examination for this admission.    ROS:  CONSTITUTIONAL:  Negative fever or chills  EYES:  Negative  blurry vision or double vision  CARDIOVASCULAR:  Negative for chest pain or palpitations  RESPIRATORY:  Negative for cough, wheezing, or SOB   GASTROINTESTINAL:  Negative for nausea, vomiting, diarrhea, constipation, or abdominal pain  GENITOURINARY:  Negative frequency, urgency or dysuria  NEUROLOGIC:  No headache, confusion, dizziness, lightheadedness  All other systems were reviewed and are negative       Current inpatient medications :    ANTIBIOTICS/RELEVANT:  cefTRIAXone   IVPB 1000 milliGRAM(s) IV Intermittent every 24 hours      MEDICATIONS  (STANDING):  aspirin enteric coated 81 milliGRAM(s) Oral daily  calcium carbonate 1250 mG  + Vitamin D (OsCal 500 + D) 1 Tablet(s) Oral two times a day  clonazePAM  Tablet 1 milliGRAM(s) Oral at bedtime  cyclobenzaprine 5 milliGRAM(s) Oral at bedtime  dextrose 40% Gel 15 Gram(s) Oral once  dextrose 5%. 1000 milliLiter(s) (50 mL/Hr) IV Continuous <Continuous>  dextrose 5%. 1000 milliLiter(s) (100 mL/Hr) IV Continuous <Continuous>  dextrose 50% Injectable 25 Gram(s) IV Push once  dextrose 50% Injectable 12.5 Gram(s) IV Push once  dextrose 50% Injectable 25 Gram(s) IV Push once  enoxaparin Injectable 40 milliGRAM(s) SubCutaneous daily  glucagon  Injectable 1 milliGRAM(s) IntraMuscular once  insulin glargine Injectable (LANTUS) 46 Unit(s) SubCutaneous at bedtime  insulin lispro (ADMELOG) corrective regimen sliding scale   SubCutaneous three times a day before meals  insulin lispro (ADMELOG) corrective regimen sliding scale   SubCutaneous at bedtime  insulin lispro Injectable (ADMELOG) 3 Unit(s) SubCutaneous three times a day before meals  mirtazapine 15 milliGRAM(s) Oral at bedtime  nicotine -  14 mG/24Hr(s) Patch 1 patch Transdermal daily  rifAMPin 600 milliGRAM(s) Oral daily  sertraline 100 milliGRAM(s) Oral daily  tamsulosin 0.8 milliGRAM(s) Oral at bedtime    MEDICATIONS  (PRN):  clonazePAM  Tablet 0.5 milliGRAM(s) Oral every 12 hours PRN anxiety  zolpidem 5 milliGRAM(s) Oral at bedtime PRN Insomnia  zolpidem 5 milliGRAM(s) Oral at bedtime PRN Insomnia      Objective:  Vital Signs Last 24 Hrs  T(C): 36.7 (25 Aug 2021 10:51), Max: 36.9 (24 Aug 2021 14:17)  T(F): 98.1 (25 Aug 2021 10:51), Max: 98.4 (24 Aug 2021 14:17)  HR: 73 (25 Aug 2021 10:51) (73 - 79)  BP: 139/68 (25 Aug 2021 10:51) (126/59 - 144/74)  RR: 18 (25 Aug 2021 10:51) (16 - 18)  SpO2: 99% (25 Aug 2021 10:51) (93% - 99%)    Physical Exam:  General: no acute distress  Neck: supple, trachea midline  Lungs: clear, no wheeze/rhonchi  Cardiovascular: regular rate and rhythm, S1 S2  Abdomen: soft, nontender,  bowel sounds normal  Neurological: alert and oriented x3  Skin: no rash  Extremities: no edema        LABS:                        11.4   4.56  )-----------( 147      ( 24 Aug 2021 07:18 )             34.3   08-    141  |  104  |  11  ----------------------------<  74  3.1<L>   |  33<H>  |  0.67    Ca    8.2<L>      24 Aug 2021 07:18    TPro  6.7  /  Alb  2.4<L>  /  TBili  0.5  /  DBili  x   /  AST  67<H>  /  ALT  71<H>  /  AlkPhos  165<H>  08-24        PT/INR - ( 23 Aug 2021 05:55 )   PT: 16.5 sec;   INR: 1.38 ratio         PTT - ( 22 Aug 2021 17:24 )  PTT:31.6 sec  Urinalysis Basic - ( 22 Aug 2021 17:24 )    Color: Yellow / Appearance: Clear / S.005 / pH: x  Gluc: x / Ketone: Negative  / Bili: Small / Urobili: 4 mg/dL   Blood: x / Protein: 30 mg/dL / Nitrite: Negative   Leuk Esterase: Small / RBC: 6-10 /HPF / WBC 3-5   Sq Epi: x / Non Sq Epi: Few / Bacteria: Few      MICROBIOLOGY:    Culture - Urine (collected 22 Aug 2021 20:44)  Source: Clean Catch Clean Catch (Midstream)  Final Report (23 Aug 2021 16:08):    No growth    Culture - Blood (collected 22 Aug 2021 19:56)  Source: .Blood Blood-Peripheral  Preliminary Report (23 Aug 2021 20:02):    No growth to date.    Culture - Blood (collected 22 Aug 2021 19:56)  Source: .Blood Blood-Peripheral  Preliminary Report (23 Aug 2021 20:02):    No growth to date.      Culture - Urine (collected 20 Aug 2021 23:21)  Source: Clean Catch Clean Catch (Midstream)  Final Report (23 Aug 2021 17:14):    50,000 - 99,000 CFU/mL Escherichia coli  Organism: Escherichia coli (23 Aug 2021 17:14)  Organism: Escherichia coli (23 Aug 2021 17:14)      -  Amikacin: S <=16      -  Amoxicillin/Clavulanic Acid: S <=8/4      -  Ampicillin: S <=8 These ampicillin results predict results for amoxicillin      -  Ampicillin/Sulbactam: S <=4/2 Enterobacter, Citrobacter, and Serratia may develop resistance during prolonged therapy (3-4 days)      -  Aztreonam: S <=4      -  Cefazolin: S <=2 (MIC_CL_COM_ENTERIC_CEFAZU) For uncomplicated UTI with K. pneumoniae, E. coli, or P. mirablis: LENKA <=16 is sensitive and LENKA >=32 is resistant. This also predicts results for oral agents cefaclor, cefdinir, cefpodoxime, cefprozil, cefuroxime axetil, cephalexin and locarbef for uncomplicated UTI. Note that some isolates may be susceptible to these agents while testing resistant to cefazolin.      -  Cefepime: S <=2      -  Cefoxitin: S <=8      -  Ceftriaxone: S <=1 Enterobacter, Citrobacter, and Serratia may develop resistance during prolonged therapy      -  Ciprofloxacin: R >2      -  Ertapenem: S <=0.5      -  Gentamicin: S <=2      -  Imipenem: S <=1      -  Levofloxacin: R >4      -  Meropenem: S <=1      -  Nitrofurantoin: S <=32 Should not be used to treat pyelonephritis      -  Piperacillin/Tazobactam: S <=8      -  Tigecycline: S <=2      -  Tobramycin: S <=2      -  Trimethoprim/Sulfamethoxazole: R >2/38      Method Type: LENKA    Culture - Blood (collected 20 Aug 2021 23:18)  Source: .Blood Blood-Peripheral  Gram Stain (21 Aug 2021 21:21):    Growth in anaerobic bottle: Gram Negative Rods    Growth in aerobic bottle: Gram Negative Rods  Final Report (23 Aug 2021 07:57):    Growth in aerobic and anaerobic bottles: Escherichia coli    ***Blood Panel PCR results on this specimen are available    approximately 3 hours after the Gram stain result.***    Gram stain, PCR, and/or culture results may not always    correspond due to difference in methodologies.    ************************************************************    This PCR assay was performed by multiplex PCR. This    Assay tests for 66 bacterial and resistance gene targets.    Please refer to the [facility] test directory    at [email or web address] for details.  Organism: Blood Culture PCR  Escherichia coli (23 Aug 2021 07:57)  Organism: Escherichia coli (23 Aug 2021 07:57)      -  Amikacin: S <=16      -  Ampicillin: S <=8 These ampicillin results predict results for amoxicillin      -  Ampicillin/Sulbactam: S <=4/2 Enterobacter, Citrobacter, and Serratia may develop resistance during prolonged therapy (3-4 days)      -  Aztreonam: S <=4      -  Cefazolin: S <=2 Enterobacter, Citrobacter, and Serratia may develop resistance during prolonged therapy (3-4 days)      -  Cefepime: S <=2      -  Cefoxitin: S <=8      -  Ceftriaxone: S <=1 Enterobacter, Citrobacter, and Serratia may develop resistance during prolonged therapy      -  Ciprofloxacin: R >2      -  Ertapenem: S <=0.5      -  Gentamicin: S <=2      -  Imipenem: S <=1      -  Levofloxacin: R >4      -  Meropenem: S <=1      -  Piperacillin/Tazobactam: S <=8      -  Tobramycin: S <=2      -  Trimethoprim/Sulfamethoxazole: R >2/38      Method Type: LENKA  Organism: Blood Culture PCR (23 Aug 2021 07:57)      -  Escherichia coli: Detec      Method Type: PCR    Culture - Blood (collected 20 Aug 2021 23:18)  Source: .Blood Blood-Peripheral  Preliminary Report (22 Aug 2021 01:01):    No growth to date.      RADIOLOGY & ADDITIONAL STUDIES:  EXAM:  CT ABDOMEN AND PELVIS IC                          EXAM:  CT CHEST IC                                  PROCEDURE DATE:  2021          INTERPRETATION:  CLINICAL INFORMATION: Elevated LFTs, bacteremia. Evaluate for source. It is    COMPARISON: None.    CONTRAST/COMPLICATIONS:  IV Contrast: Omnipaque 300 (accession [id number]), IV contrast documented in associated exam (accession [id number])  90 cc administered   10 cc discarded  Oral Contrast: NONE  Complications: None reported at time ofstudy completion    PROCEDURE:  CT of the Chest, Abdomen and Pelvis was performed.  Sagittal and coronal reformats were performed.    FINDINGS:  CHEST:  LUNGS AND LARGE AIRWAYS: Trace linear secretions in the trachea. Otherwise central airways are patent. Mild centrilobular emphysema. Bibasilar dependent atelectasis. 4 mm right lower lobe nodule (4, 98).  PLEURA: No pleural effusion.  VESSELS: Atherosclerotic changes of the aorta.  HEART: Heart size is normal. No pericardial effusion.  MEDIASTINUM AND HALINA: No lymphadenopathy. Nonspecific 9 mm  esophageal node (4, 122).  CHEST WALL AND LOWER NECK: Within normal limits.    ABDOMEN AND PELVIS:  LIVER: Nonspecific focus of 9 mm enhancement in the posterior right hepatic lobe (4, 138).  BILE DUCTS: Normal caliber.  GALLBLADDER: Within normal limits.  SPLEEN: Splenomegaly.  PANCREAS: Within normal limits.  ADRENALS: Within normal limits.  KIDNEYS/URETERS: Mild bilateral perinephric stranding. Right renal cyst and bilateral subcentimeter hypodensities too small to characterize. Heterogeneous enhancement in the left upper pole (example 7, 78), the right upper pole (7, 79) and the medial right interpolar kidney (7, 65). No hydronephrosis or obstructing stone. Mild bilateral ureteral enhancement.    BLADDER: Decompressed by catheter. Diffuse wall thickening, though underdistended.  REPRODUCTIVE ORGANS: Enlarged prostate.    BOWEL: No bowel obstruction. Appendix is normal. Circumferential wall thickening of the proximal duodenum, possibly related to underdistention. No surrounding inflammatory changes.  PERITONEUM: No ascites.  VESSELS: Atherosclerotic changes.  RETROPERITONEUM/LYMPH NODES: No lymphadenopathy.  ABDOMINAL WALL: Within normal limits.  BONES: Chronic left ninth rib fracture deformity. Degenerative changes.    IMPRESSION:    1. Findings suspicious for bilateral pyelonephritis, ureteritis and possibly cystitis. Correlation with urinalysis is recommended.  2. Circumferential wall thickening of the proximal duodenum, whichcould be related to underdistention. No associated inflammatory changes. Clinical correlation is recommended.  3. 9 mm focus of hyperenhancement in the posterior right hepatic lobe is indeterminate, possibly flash filling hemangioma. Follow-up MRI isrecommended for further evaluation.  4. Splenomegaly.  5. Mild emphysema. 4 mm nodule in the right lower lobe. Optional follow-up chest CT in 12 months can be obtained to assess for stability.      Assessment :   This is a 66 y/o M with PMH of HTN, DM type 2, Dyslipidemia, MS with right sided lower extremity weakness, Latent TB on therapy, recently diagnosed BPH with recent urinary retention with indwelling urinary catheter approximately 3 weeks ago admitted with Ecoli bacteremia sec CAUTI  Repeat blood cultures single set GNR likely Ecoli      Plan :   On Rocephin  Can change to po Ceftin x 7 days for dc    Repeat blood culture x 1  Trend temps and cbc  Stable from ID standpoint  Fu urology outpt    D/w Dr Peterson    Continue with present regiment.  Appropriate use of antibiotics and adverse effects reviewed.      I have discussed the above plan of care with patient in detail. He expressed understanding of the  treatment plan . Risks, benefits and alternatives discussed in detail. I have asked if he has any questions or concerns and appropriately addressed them to the best of my ability .    > 35 minutes were spent in direct patient care reviewing notes, medications ,labs data/ imaging , discussion with multidisciplinary team.    Thank you for allowing me to participate in care of your patient .    George Fox MD  Infectious Disease  [phone]      KAMILA FUENTES is a 65yMale , patient examined and chart reviewed.     INTERVAL HPI/ OVERNIGHT EVENTS:   Feels well. Afebrile.  Coker dc - voiding well.    PAST MEDICAL & SURGICAL HISTORY:  DM (diabetes mellitus)  Hyperlipemia  BPH (benign prostatic hyperplasia)  MS (multiple sclerosis)  Anxiety  Tuberculosis      For details regarding the patient's social history, family history, and other miscellaneous elements, please refer the initial infectious diseases consultation and/or the admitting history and physical examination for this admission.    ROS:  CONSTITUTIONAL:  Negative fever or chills  EYES:  Negative  blurry vision or double vision  CARDIOVASCULAR:  Negative for chest pain or palpitations  RESPIRATORY:  Negative for cough, wheezing, or SOB   GASTROINTESTINAL:  Negative for nausea, vomiting, diarrhea, constipation, or abdominal pain  GENITOURINARY:  Negative frequency, urgency or dysuria  NEUROLOGIC:  No headache, confusion, dizziness, lightheadedness  All other systems were reviewed and are negative       Current inpatient medications :    ANTIBIOTICS/RELEVANT:  cefTRIAXone   IVPB 1000 milliGRAM(s) IV Intermittent every 24 hours      MEDICATIONS  (STANDING):  aspirin enteric coated 81 milliGRAM(s) Oral daily  calcium carbonate 1250 mG  + Vitamin D (OsCal 500 + D) 1 Tablet(s) Oral two times a day  clonazePAM  Tablet 1 milliGRAM(s) Oral at bedtime  cyclobenzaprine 5 milliGRAM(s) Oral at bedtime  dextrose 40% Gel 15 Gram(s) Oral once  dextrose 5%. 1000 milliLiter(s) (50 mL/Hr) IV Continuous <Continuous>  dextrose 5%. 1000 milliLiter(s) (100 mL/Hr) IV Continuous <Continuous>  dextrose 50% Injectable 25 Gram(s) IV Push once  dextrose 50% Injectable 12.5 Gram(s) IV Push once  dextrose 50% Injectable 25 Gram(s) IV Push once  enoxaparin Injectable 40 milliGRAM(s) SubCutaneous daily  glucagon  Injectable 1 milliGRAM(s) IntraMuscular once  insulin glargine Injectable (LANTUS) 46 Unit(s) SubCutaneous at bedtime  insulin lispro (ADMELOG) corrective regimen sliding scale   SubCutaneous three times a day before meals  insulin lispro (ADMELOG) corrective regimen sliding scale   SubCutaneous at bedtime  insulin lispro Injectable (ADMELOG) 3 Unit(s) SubCutaneous three times a day before meals  mirtazapine 15 milliGRAM(s) Oral at bedtime  nicotine -  14 mG/24Hr(s) Patch 1 patch Transdermal daily  rifAMPin 600 milliGRAM(s) Oral daily  sertraline 100 milliGRAM(s) Oral daily  tamsulosin 0.8 milliGRAM(s) Oral at bedtime    MEDICATIONS  (PRN):  clonazePAM  Tablet 0.5 milliGRAM(s) Oral every 12 hours PRN anxiety  zolpidem 5 milliGRAM(s) Oral at bedtime PRN Insomnia  zolpidem 5 milliGRAM(s) Oral at bedtime PRN Insomnia      Objective:  Vital Signs Last 24 Hrs  T(C): 36.7 (25 Aug 2021 10:51), Max: 36.9 (24 Aug 2021 14:17)  T(F): 98.1 (25 Aug 2021 10:51), Max: 98.4 (24 Aug 2021 14:17)  HR: 73 (25 Aug 2021 10:51) (73 - 79)  BP: 139/68 (25 Aug 2021 10:51) (126/59 - 144/74)  RR: 18 (25 Aug 2021 10:51) (16 - 18)  SpO2: 99% (25 Aug 2021 10:51) (93% - 99%)    Physical Exam:  General: no acute distress  Neck: supple, trachea midline  Lungs: clear, no wheeze/rhonchi  Cardiovascular: regular rate and rhythm, S1 S2  Abdomen: soft, nontender,  bowel sounds normal  Neurological: alert and oriented x3  Skin: no rash  Extremities: no edema        LABS:                        11.4   4.56  )-----------( 147      ( 24 Aug 2021 07:18 )             34.3   08-    141  |  104  |  11  ----------------------------<  74  3.1<L>   |  33<H>  |  0.67    Ca    8.2<L>      24 Aug 2021 07:18    TPro  6.7  /  Alb  2.4<L>  /  TBili  0.5  /  DBili  x   /  AST  67<H>  /  ALT  71<H>  /  AlkPhos  165<H>  08-24        PT/INR - ( 23 Aug 2021 05:55 )   PT: 16.5 sec;   INR: 1.38 ratio         PTT - ( 22 Aug 2021 17:24 )  PTT:31.6 sec  Urinalysis Basic - ( 22 Aug 2021 17:24 )    Color: Yellow / Appearance: Clear / S.005 / pH: x  Gluc: x / Ketone: Negative  / Bili: Small / Urobili: 4 mg/dL   Blood: x / Protein: 30 mg/dL / Nitrite: Negative   Leuk Esterase: Small / RBC: 6-10 /HPF / WBC 3-5   Sq Epi: x / Non Sq Epi: Few / Bacteria: Few      MICROBIOLOGY:    Culture - Urine (collected 22 Aug 2021 20:44)  Source: Clean Catch Clean Catch (Midstream)  Final Report (23 Aug 2021 16:08):    No growth    Culture - Blood (collected 22 Aug 2021 19:56)  Source: .Blood Blood-Peripheral  Preliminary Report (23 Aug 2021 20:02):    No growth to date.    Culture - Blood (collected 22 Aug 2021 19:56)  Source: .Blood Blood-Peripheral  Preliminary Report (23 Aug 2021 20:02):    No growth to date.      Culture - Urine (collected 20 Aug 2021 23:21)  Source: Clean Catch Clean Catch (Midstream)  Final Report (23 Aug 2021 17:14):    50,000 - 99,000 CFU/mL Escherichia coli  Organism: Escherichia coli (23 Aug 2021 17:14)  Organism: Escherichia coli (23 Aug 2021 17:14)      -  Amikacin: S <=16      -  Amoxicillin/Clavulanic Acid: S <=8/4      -  Ampicillin: S <=8 These ampicillin results predict results for amoxicillin      -  Ampicillin/Sulbactam: S <=4/2 Enterobacter, Citrobacter, and Serratia may develop resistance during prolonged therapy (3-4 days)      -  Aztreonam: S <=4      -  Cefazolin: S <=2 (MIC_CL_COM_ENTERIC_CEFAZU) For uncomplicated UTI with K. pneumoniae, E. coli, or P. mirablis: LENKA <=16 is sensitive and LENKA >=32 is resistant. This also predicts results for oral agents cefaclor, cefdinir, cefpodoxime, cefprozil, cefuroxime axetil, cephalexin and locarbef for uncomplicated UTI. Note that some isolates may be susceptible to these agents while testing resistant to cefazolin.      -  Cefepime: S <=2      -  Cefoxitin: S <=8      -  Ceftriaxone: S <=1 Enterobacter, Citrobacter, and Serratia may develop resistance during prolonged therapy      -  Ciprofloxacin: R >2      -  Ertapenem: S <=0.5      -  Gentamicin: S <=2      -  Imipenem: S <=1      -  Levofloxacin: R >4      -  Meropenem: S <=1      -  Nitrofurantoin: S <=32 Should not be used to treat pyelonephritis      -  Piperacillin/Tazobactam: S <=8      -  Tigecycline: S <=2      -  Tobramycin: S <=2      -  Trimethoprim/Sulfamethoxazole: R >2/38      Method Type: LENKA    Culture - Blood (collected 20 Aug 2021 23:18)  Source: .Blood Blood-Peripheral  Gram Stain (21 Aug 2021 21:21):    Growth in anaerobic bottle: Gram Negative Rods    Growth in aerobic bottle: Gram Negative Rods  Final Report (23 Aug 2021 07:57):    Growth in aerobic and anaerobic bottles: Escherichia coli    ***Blood Panel PCR results on this specimen are available    approximately 3 hours after the Gram stain result.***    Gram stain, PCR, and/or culture results may not always    correspond due to difference in methodologies.    ************************************************************    This PCR assay was performed by multiplex PCR. This    Assay tests for 66 bacterial and resistance gene targets.    Please refer to the [facility] test directory    at [email or web address] for details.  Organism: Blood Culture PCR  Escherichia coli (23 Aug 2021 07:57)  Organism: Escherichia coli (23 Aug 2021 07:57)      -  Amikacin: S <=16      -  Ampicillin: S <=8 These ampicillin results predict results for amoxicillin      -  Ampicillin/Sulbactam: S <=4/2 Enterobacter, Citrobacter, and Serratia may develop resistance during prolonged therapy (3-4 days)      -  Aztreonam: S <=4      -  Cefazolin: S <=2 Enterobacter, Citrobacter, and Serratia may develop resistance during prolonged therapy (3-4 days)      -  Cefepime: S <=2      -  Cefoxitin: S <=8      -  Ceftriaxone: S <=1 Enterobacter, Citrobacter, and Serratia may develop resistance during prolonged therapy      -  Ciprofloxacin: R >2      -  Ertapenem: S <=0.5      -  Gentamicin: S <=2      -  Imipenem: S <=1      -  Levofloxacin: R >4      -  Meropenem: S <=1      -  Piperacillin/Tazobactam: S <=8      -  Tobramycin: S <=2      -  Trimethoprim/Sulfamethoxazole: R >2/38      Method Type: LENKA  Organism: Blood Culture PCR (23 Aug 2021 07:57)      -  Escherichia coli: Detec      Method Type: PCR    Culture - Blood (collected 20 Aug 2021 23:18)  Source: .Blood Blood-Peripheral  Preliminary Report (22 Aug 2021 01:01):    No growth to date.      RADIOLOGY & ADDITIONAL STUDIES:  EXAM:  CT ABDOMEN AND PELVIS IC                          EXAM:  CT CHEST IC                                  PROCEDURE DATE:  2021          INTERPRETATION:  CLINICAL INFORMATION: Elevated LFTs, bacteremia. Evaluate for source. It is    COMPARISON: None.    CONTRAST/COMPLICATIONS:  IV Contrast: Omnipaque 300 (accession [id number]), IV contrast documented in associated exam (accession [id number])  90 cc administered   10 cc discarded  Oral Contrast: NONE  Complications: None reported at time ofstudy completion    PROCEDURE:  CT of the Chest, Abdomen and Pelvis was performed.  Sagittal and coronal reformats were performed.    FINDINGS:  CHEST:  LUNGS AND LARGE AIRWAYS: Trace linear secretions in the trachea. Otherwise central airways are patent. Mild centrilobular emphysema. Bibasilar dependent atelectasis. 4 mm right lower lobe nodule (4, 98).  PLEURA: No pleural effusion.  VESSELS: Atherosclerotic changes of the aorta.  HEART: Heart size is normal. No pericardial effusion.  MEDIASTINUM AND HALINA: No lymphadenopathy. Nonspecific 9 mm  esophageal node (4, 122).  CHEST WALL AND LOWER NECK: Within normal limits.    ABDOMEN AND PELVIS:  LIVER: Nonspecific focus of 9 mm enhancement in the posterior right hepatic lobe (4, 138).  BILE DUCTS: Normal caliber.  GALLBLADDER: Within normal limits.  SPLEEN: Splenomegaly.  PANCREAS: Within normal limits.  ADRENALS: Within normal limits.  KIDNEYS/URETERS: Mild bilateral perinephric stranding. Right renal cyst and bilateral subcentimeter hypodensities too small to characterize. Heterogeneous enhancement in the left upper pole (example 7, 78), the right upper pole (7, 79) and the medial right interpolar kidney (7, 65). No hydronephrosis or obstructing stone. Mild bilateral ureteral enhancement.    BLADDER: Decompressed by catheter. Diffuse wall thickening, though underdistended.  REPRODUCTIVE ORGANS: Enlarged prostate.    BOWEL: No bowel obstruction. Appendix is normal. Circumferential wall thickening of the proximal duodenum, possibly related to underdistention. No surrounding inflammatory changes.  PERITONEUM: No ascites.  VESSELS: Atherosclerotic changes.  RETROPERITONEUM/LYMPH NODES: No lymphadenopathy.  ABDOMINAL WALL: Within normal limits.  BONES: Chronic left ninth rib fracture deformity. Degenerative changes.    IMPRESSION:    1. Findings suspicious for bilateral pyelonephritis, ureteritis and possibly cystitis. Correlation with urinalysis is recommended.  2. Circumferential wall thickening of the proximal duodenum, whichcould be related to underdistention. No associated inflammatory changes. Clinical correlation is recommended.  3. 9 mm focus of hyperenhancement in the posterior right hepatic lobe is indeterminate, possibly flash filling hemangioma. Follow-up MRI isrecommended for further evaluation.  4. Splenomegaly.  5. Mild emphysema. 4 mm nodule in the right lower lobe. Optional follow-up chest CT in 12 months can be obtained to assess for stability.      Assessment :   This is a 66 y/o M with PMH of HTN, DM type 2, Dyslipidemia, MS with right sided lower extremity weakness, Latent TB on therapy, recently diagnosed BPH with recent urinary retention with indwelling urinary catheter approximately 3 weeks ago admitted with Ecoli bacteremia sec CAUTI  Repeat blood cultures single set GNR likely Ecoli  Coker dc - voiding well. Last bladder scan 50cc  Clinically improved    Plan :   On Rocephin  Can change to po Ceftin x 7 days for dc    Repeat blood culture x 1  Trend temps and cbc  Stable from ID standpoint  [facility] out[facility]    D/w Dr Peterson    Continue with present regiment.  Appropriate use of antibiotics and adverse effects reviewed.      I have discussed the above plan of care with patient in detail. He expressed understanding of the  treatment plan . Risks, benefits and alternatives discussed in detail. I have asked if he has any questions or concerns and appropriately addressed them to the best of my ability .    > 35 minutes were spent in direct patient care reviewing notes, medications ,labs data/ imaging , discussion with multidisciplinary team.    Thank you for allowing me to participate in care of your patient .    George Fox MD  Infectious Disease  [phone]

## 2021-08-25 NOTE — DISCHARGE NOTE PROVIDER - PROVIDER TOKENS
PROVIDER:[TOKEN:[[id number]]],PROVIDER:[TOKEN:[[id number]],FOLLOWUP:[1 week]],PROVIDER:[TOKEN:[[id number]]]

## 2021-08-26 LAB
CULTURE RESULTS: SIGNIFICANT CHANGE UP
SPECIMEN SOURCE: SIGNIFICANT CHANGE UP

## 2021-08-27 LAB
-  AMIKACIN: SIGNIFICANT CHANGE UP
-  AMPICILLIN/SULBACTAM: SIGNIFICANT CHANGE UP
-  AMPICILLIN: SIGNIFICANT CHANGE UP
-  AZTREONAM: SIGNIFICANT CHANGE UP
-  CEFAZOLIN: SIGNIFICANT CHANGE UP
-  CEFEPIME: SIGNIFICANT CHANGE UP
-  CEFOXITIN: SIGNIFICANT CHANGE UP
-  CEFTRIAXONE: SIGNIFICANT CHANGE UP
-  CIPROFLOXACIN: SIGNIFICANT CHANGE UP
-  ERTAPENEM: SIGNIFICANT CHANGE UP
-  GENTAMICIN: SIGNIFICANT CHANGE UP
-  IMIPENEM: SIGNIFICANT CHANGE UP
-  LEVOFLOXACIN: SIGNIFICANT CHANGE UP
-  MEROPENEM: SIGNIFICANT CHANGE UP
-  PIPERACILLIN/TAZOBACTAM: SIGNIFICANT CHANGE UP
-  TOBRAMYCIN: SIGNIFICANT CHANGE UP
-  TRIMETHOPRIM/SULFAMETHOXAZOLE: SIGNIFICANT CHANGE UP
CULTURE RESULTS: SIGNIFICANT CHANGE UP
CULTURE RESULTS: SIGNIFICANT CHANGE UP
METHOD TYPE: SIGNIFICANT CHANGE UP
ORGANISM # SPEC MICROSCOPIC CNT: SIGNIFICANT CHANGE UP
ORGANISM # SPEC MICROSCOPIC CNT: SIGNIFICANT CHANGE UP
SPECIMEN SOURCE: SIGNIFICANT CHANGE UP
SPECIMEN SOURCE: SIGNIFICANT CHANGE UP

## 2021-08-31 LAB
CULTURE RESULTS: SIGNIFICANT CHANGE UP
SPECIMEN SOURCE: SIGNIFICANT CHANGE UP

## 2021-09-17 ENCOUNTER — TRANSCRIPTION ENCOUNTER (OUTPATIENT)
Age: 66
End: 2021-09-17

## 2022-07-10 NOTE — PROGRESS NOTE ADULT - ASSESSMENT
66 y/o M with PMH of HTN, DM type 2, Dyslipidemia, MS with right sided lower extremity weakness, Latent TB on therapy, recently diagnosed BPH with urinary retention with indwelling urinary catheter, Insomnia, and Anxiety presented with a positive blood & urine cultures.
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
Yes

## 2022-11-21 NOTE — ED ADULT TRIAGE NOTE - NSWEIGHTCALCTOOLDRUG_GEN_A_CORE
Back pain improved, on Gabapentin, s/p SCS removal, doing well, con't current meds and call with new/worse symptoms  used

## 2023-12-21 NOTE — ED ADULT NURSE NOTE - NSIMPLEMENTINTERV_GEN_ALL_ED
1 Implemented All Universal Safety Interventions:  [place] to call system. Call bell, personal items and telephone within reach. Instruct patient to call for assistance. Room bathroom lighting operational. Non-slip footwear when patient is off stretcher. Physically safe environment: no spills, clutter or unnecessary equipment. Stretcher in lowest position, wheels locked, appropriate side rails in place.

## 2024-04-29 NOTE — ED PROVIDER NOTE - MUSCULOSKELETAL, MLM
Spontaneous, unlabored and symmetrical
Spine appears normal, range of motion is not limited, no muscle or joint tenderness
